# Patient Record
Sex: MALE | Race: WHITE | Employment: FULL TIME | URBAN - METROPOLITAN AREA
[De-identification: names, ages, dates, MRNs, and addresses within clinical notes are randomized per-mention and may not be internally consistent; named-entity substitution may affect disease eponyms.]

---

## 2017-10-12 ENCOUNTER — OFFICE VISIT (OUTPATIENT)
Dept: FAMILY MEDICINE CLINIC | Age: 42
End: 2017-10-12

## 2017-10-12 VITALS
SYSTOLIC BLOOD PRESSURE: 120 MMHG | HEART RATE: 66 BPM | TEMPERATURE: 98.2 F | BODY MASS INDEX: 31.04 KG/M2 | HEIGHT: 72 IN | DIASTOLIC BLOOD PRESSURE: 80 MMHG | WEIGHT: 229.2 LBS | OXYGEN SATURATION: 98 %

## 2017-10-12 DIAGNOSIS — L72.3 SEBACEOUS CYST: Primary | ICD-10-CM

## 2017-10-12 PROCEDURE — 99213 OFFICE O/P EST LOW 20 MIN: CPT | Performed by: FAMILY MEDICINE

## 2017-10-12 RX ORDER — DOXYCYCLINE 100 MG/1
100 CAPSULE ORAL 2 TIMES DAILY
Qty: 20 CAPSULE | Refills: 0 | Status: SHIPPED | OUTPATIENT
Start: 2017-10-12 | End: 2017-12-07 | Stop reason: SDUPTHER

## 2017-10-12 NOTE — PATIENT INSTRUCTIONS
Please read the healthy family handout that you were given and share it with your family. Please compare this printed medication list with your medications at home to be sure they are the same. If you have any medications that are different please contact us immediately at [phone]. Also review your allergies that we have listed, these may also include medications that you have not been able to tolerate, make sure everything listed is correct. If you have any allergies that are different please contact us immediately at [phone].

## 2017-10-18 ENCOUNTER — INITIAL CONSULT (OUTPATIENT)
Dept: SURGERY | Age: 42
End: 2017-10-18

## 2017-10-18 VITALS
BODY MASS INDEX: 31.15 KG/M2 | HEIGHT: 72 IN | SYSTOLIC BLOOD PRESSURE: 130 MMHG | DIASTOLIC BLOOD PRESSURE: 88 MMHG | WEIGHT: 230 LBS

## 2017-10-18 DIAGNOSIS — L72.3 SEBACEOUS CYST: Primary | ICD-10-CM

## 2017-10-18 PROCEDURE — G8484 FLU IMMUNIZE NO ADMIN: HCPCS | Performed by: SURGERY

## 2017-10-18 PROCEDURE — G8427 DOCREV CUR MEDS BY ELIG CLIN: HCPCS | Performed by: SURGERY

## 2017-10-18 PROCEDURE — 99241 PR OFFICE CONSULTATION NEW/ESTAB PATIENT 15 MIN: CPT | Performed by: SURGERY

## 2017-10-18 PROCEDURE — G8417 CALC BMI ABV UP PARAM F/U: HCPCS | Performed by: SURGERY

## 2017-10-23 RX ORDER — LIDOCAINE HYDROCHLORIDE 10 MG/ML
2 INJECTION, SOLUTION INFILTRATION; PERINEURAL
Status: CANCELLED | OUTPATIENT
Start: 2017-10-23 | End: 2017-10-23

## 2017-10-23 RX ORDER — SODIUM CHLORIDE, SODIUM LACTATE, POTASSIUM CHLORIDE, CALCIUM CHLORIDE 600; 310; 30; 20 MG/100ML; MG/100ML; MG/100ML; MG/100ML
INJECTION, SOLUTION INTRAVENOUS CONTINUOUS
Status: CANCELLED | OUTPATIENT
Start: 2017-10-23

## 2017-10-24 ENCOUNTER — HOSPITAL ENCOUNTER (OUTPATIENT)
Dept: SURGERY | Age: 42
Discharge: HOME | End: 2017-10-24
Attending: SURGERY | Admitting: SURGERY

## 2017-10-24 NOTE — PROGRESS NOTES
[facility]    HPI:  Patient is 35y.o. year old male seen at request of Ravi Raines MD.  He presents because of lump located on back. There has been pain at or near the lesion and a recent increase in size. There is not  previous history of similar. There has not been any biopsy. No past medical history on file. Past Surgical History:   Procedure Laterality Date    PILONIDAL CYST EXCISION  2004       Current Outpatient Prescriptions on File Prior to Visit   Medication Sig Dispense Refill    doxycycline monohydrate (MONODOX) 100 MG capsule Take 1 capsule by mouth 2 times daily 20 capsule 0     No current facility-administered medications on file prior to visit. No Known Allergies    Social History     Social History    Marital status:      Spouse name: N/A    Number of children: N/A    Years of education: N/A     Occupational History    Not on file. Social History Main Topics    Smoking status: Never Smoker    Smokeless tobacco: Current User     Types: Snuff    Alcohol use 18.0 oz/week     36 Standard drinks or equivalent per week    Drug use: No    Sexual activity: Not on file     Other Topics Concern    Not on file     Social History Narrative    No narrative on file       Family History   Problem Relation Age of Onset    Diabetes Father        ROS:  He reports no complaints related to the eyes, ears , nose throat or mouth. He denies weight loss. No chest pain. No SOB. No urinary complaints. No musculoskeletal complaints. Skin complaints include lump on back. No neurologic deficits. No bleeding tendencies. No GI complaints. Physical Exam:  Vitals:    10/18/17 1348   BP: 130/88   230#  6'0\"  General:  Comfortable. No distress. Eyes:  No scleral icterus  Ears:  Normal  Nose:  Normal  Mouth:  Mucous membranes moist  Respiratory: Lungs CTA. No accessory muscle use. Heart:  Regular rhythm  Abdomen:  Soft. Non tender.   Non

## 2017-12-07 ENCOUNTER — TELEPHONE (OUTPATIENT)
Dept: FAMILY MEDICINE CLINIC | Age: 42
End: 2017-12-07

## 2017-12-07 RX ORDER — DOXYCYCLINE 100 MG/1
100 CAPSULE ORAL 2 TIMES DAILY
Qty: 20 CAPSULE | Refills: 0 | Status: SHIPPED | OUTPATIENT
Start: 2017-12-07 | End: 2018-12-17 | Stop reason: ALTCHOICE

## 2017-12-07 NOTE — TELEPHONE ENCOUNTER
Patient was seen on 10/12 for sebaceous cyst was prescribed doxycycline, had the same thing come upon his shoulder last night wanting to know if antibiotic can be called in, please advise.

## 2018-12-17 ENCOUNTER — OFFICE VISIT (OUTPATIENT)
Dept: FAMILY MEDICINE CLINIC | Age: 43
End: 2018-12-17
Payer: COMMERCIAL

## 2018-12-17 VITALS
SYSTOLIC BLOOD PRESSURE: 124 MMHG | OXYGEN SATURATION: 98 % | HEART RATE: 61 BPM | WEIGHT: 229 LBS | DIASTOLIC BLOOD PRESSURE: 84 MMHG | TEMPERATURE: 98.7 F | BODY MASS INDEX: 31.05 KG/M2

## 2018-12-17 DIAGNOSIS — H61.22 IMPACTED CERUMEN OF LEFT EAR: Primary | ICD-10-CM

## 2018-12-17 PROCEDURE — 69210 REMOVE IMPACTED EAR WAX UNI: CPT | Performed by: NURSE PRACTITIONER

## 2018-12-17 PROCEDURE — 99214 OFFICE O/P EST MOD 30 MIN: CPT | Performed by: NURSE PRACTITIONER

## 2018-12-17 PROCEDURE — G8417 CALC BMI ABV UP PARAM F/U: HCPCS | Performed by: NURSE PRACTITIONER

## 2018-12-17 PROCEDURE — 4004F PT TOBACCO SCREEN RCVD TLK: CPT | Performed by: NURSE PRACTITIONER

## 2018-12-17 PROCEDURE — G8484 FLU IMMUNIZE NO ADMIN: HCPCS | Performed by: NURSE PRACTITIONER

## 2018-12-17 PROCEDURE — G8427 DOCREV CUR MEDS BY ELIG CLIN: HCPCS | Performed by: NURSE PRACTITIONER

## 2018-12-17 NOTE — PROGRESS NOTES
Assessment:  Encounter Diagnosis   Name Primary?  Impacted cerumen of left ear Yes       Plan:  1. Impacted cerumen of left ear  New problem  Stop using Q-tips and ear buds. May use earwax softener weekly. Ear was irrigated by Meghan Amaral MA. Complete cerumen removal.  TM intact. Canal intact and not irritated. No drops prescribed. Follow-up as needed  - [address], APRN-CNP    The note was completedusing Dragon voice recognition transcription. Every effort was made to ensure accuracy; however, inadvertent  transcription errors may be present despite my best efforts to edit errors.

## 2018-12-17 NOTE — PATIENT INSTRUCTIONS
thoroughly with a hair dryer set on low. Hold the dryer several inches from your ear. ? If the warm mineral oil and shower do not work, use an over-the-counter wax softener. Read and follow all instructions on the label. After using the wax softener, use an ear syringe to gently flush the ear. Make sure the flushing solution is body temperature. Cool or hot fluids in the ear can cause dizziness. When should you call for help? Call your doctor now or seek immediate medical care if:    · Pus or blood drains from your ear.     · Your ears are ringing or feel full.     · You have a loss of hearing.    Watch closely for changes in your health, and be sure to contact your doctor if:    · You have pain or reduced hearing after 1 week of home treatment.     · You have any new symptoms, such as nausea or balance problems. Where can you learn more? Go to [email or web address]://[email or web address]. org and sign in to your [employer] [address] account. Enter R955 in the [employer] box to learn more about \"Earwax Blockage: Care Instructions. \"     If you do not have an account, please click on the \"Sign Up Now\" link. Current as of: November 20, 2017  Content Version: 11.8  © 5318-6431 Healthwise, Incorporated. Care instructions adapted under license by [facility] ([facility]). If you have questions about a medical condition or this instruction, always ask your healthcare professional. [place] [address] any warranty or liability for your use of this information.

## 2020-01-31 ENCOUNTER — OFFICE VISIT (OUTPATIENT)
Dept: FAMILY MEDICINE CLINIC | Age: 45
End: 2020-01-31
Payer: COMMERCIAL

## 2020-01-31 VITALS
SYSTOLIC BLOOD PRESSURE: 124 MMHG | WEIGHT: 225 LBS | BODY MASS INDEX: 30.48 KG/M2 | HEIGHT: 72 IN | HEART RATE: 61 BPM | DIASTOLIC BLOOD PRESSURE: 75 MMHG | TEMPERATURE: 98.4 F | OXYGEN SATURATION: 96 %

## 2020-01-31 LAB
ALT SERPL-CCNC: 15 U/L (ref 10–40)
ANION GAP SERPL CALCULATED.3IONS-SCNC: 16 MMOL/L (ref 3–16)
BUN BLDV-MCNC: 21 MG/DL (ref 7–20)
CALCIUM SERPL-MCNC: 9.7 MG/DL (ref 8.3–10.6)
CHLORIDE BLD-SCNC: 104 MMOL/L (ref 99–110)
CHOLESTEROL, TOTAL: 182 MG/DL (ref 0–199)
CO2: 24 MMOL/L (ref 21–32)
CREAT SERPL-MCNC: 1.2 MG/DL (ref 0.9–1.3)
GFR AFRICAN AMERICAN: >60
GFR NON-AFRICAN AMERICAN: >60
GLUCOSE BLD-MCNC: 97 MG/DL (ref 70–99)
HDLC SERPL-MCNC: 37 MG/DL (ref 40–60)
LDL CHOLESTEROL CALCULATED: 106 MG/DL
POTASSIUM SERPL-SCNC: 4.6 MMOL/L (ref 3.5–5.1)
SODIUM BLD-SCNC: 144 MMOL/L (ref 136–145)
TRIGL SERPL-MCNC: 196 MG/DL (ref 0–150)
VLDLC SERPL CALC-MCNC: 39 MG/DL

## 2020-01-31 PROCEDURE — 99396 PREV VISIT EST AGE 40-64: CPT | Performed by: FAMILY MEDICINE

## 2020-01-31 PROCEDURE — 36415 COLL VENOUS BLD VENIPUNCTURE: CPT | Performed by: FAMILY MEDICINE

## 2020-01-31 PROCEDURE — G8484 FLU IMMUNIZE NO ADMIN: HCPCS | Performed by: FAMILY MEDICINE

## 2020-01-31 ASSESSMENT — PATIENT HEALTH QUESTIONNAIRE - PHQ9
SUM OF ALL RESPONSES TO PHQ QUESTIONS 1-9: 0
2. FEELING DOWN, DEPRESSED OR HOPELESS: 0
1. LITTLE INTEREST OR PLEASURE IN DOING THINGS: 0
SUM OF ALL RESPONSES TO PHQ QUESTIONS 1-9: 0
SUM OF ALL RESPONSES TO PHQ9 QUESTIONS 1 & 2: 0

## 2020-01-31 NOTE — PROGRESS NOTES
Subjective:  Martinez Lambert is here to discuss the following issues. This patient is here for well adult exam.  Overall he is doing well. He takes no prescription medicines. He has only some minor concerns to express. He has noted some mild difficulty in urination where his urine stream is not as strong as it was years ago. He gets up only once at night to urinate. He has noted some occasional mild constipation. This began after he quit using artificial sweeteners. Otherwise he states he is feeling well. He is fasting for blood work. No medication allergies and his problem list surgeries and hospitalizations are reviewed. Social and family history reviewed. Immunizations reviewed. He exercises every day. He eats a good variety of foods. He sleeps well at night. He works and in the [employer]. Patient reports no activity change, appetite change, neck pain, neck stiffness, eye discharge, eye pain, choking, stridor, chest pain, palpitations, diarrhea, vomiting, polydipsia, polyphagia, enuresis, hematuria, pallor, rash, seizure, syncope  Social History     Tobacco Use   Smoking Status Never Smoker   Smokeless Tobacco Current User    Types: Snuff   Allergies:     Patient has no known allergies. Objective:  /75   Pulse 61   Temp 98.4 °F (36.9 °C) (Oral)   Ht 5' 11.75\" (1.822 m)   Wt 225 lb (102.1 kg)   SpO2 96%   BMI 30.73 kg/m²    OBJECTIVE:  PHYSICAL EXAM:  Oriented to person place and time. Appears well-developed and well-nourished. No distress. Head normocephalic and atraumatic  Bilateral external ears normal  Nose normal  Oropharynx is clear and moist. No oral pharyngeal exudate  Conjunctivae and extraocular muscles are normal. Pupils are equal round and reactive. No ocular discharge. No scleral icterus  Neck normal range of motion. Neck supple. No tracheal deviation. No thyromegaly. Heart regular rate and rhythm with normal heart sounds. No gallop.  No murmur   Respiratory

## 2020-02-05 LAB
PROSTATE SPECIFIC ANTIGEN FREE: 0.1 UG/L
PROSTATE SPECIFIC ANTIGEN PERCENT FREE: 33.3 %
PROSTATE SPECIFIC ANTIGEN: 0.3 UG/L (ref 0–4)

## 2020-10-08 ENCOUNTER — TELEPHONE (OUTPATIENT)
Dept: FAMILY MEDICINE CLINIC | Age: 45
End: 2020-10-08

## 2020-10-08 NOTE — TELEPHONE ENCOUNTER
----- Message from Karyn Too sent at 10/8/2020  9:03 AM EDT -----  Subject: Message to Provider    QUESTIONS  Information for Provider? Patient scheduled annual physical for 12/4 at   3pm but he is needing blood work done for that. He is wondering if you   could put the orders in and that week he go in the morning to get his   blood work done so he doesn't have to go all day without eating.  ---------------------------------------------------------------------------  --------------  [address]  What is the best way for the office to contact you? OK to leave message on   voicemail  Preferred Call Back Phone Number? [phone]  ---------------------------------------------------------------------------  --------------  SCRIPT ANSWERS  Relationship to Patient?  Self

## 2020-12-01 ENCOUNTER — NURSE ONLY (OUTPATIENT)
Dept: FAMILY MEDICINE CLINIC | Age: 45
End: 2020-12-01
Payer: COMMERCIAL

## 2020-12-01 LAB
ALT SERPL-CCNC: 25 U/L (ref 10–40)
ANION GAP SERPL CALCULATED.3IONS-SCNC: 11 MMOL/L (ref 3–16)
BUN BLDV-MCNC: 18 MG/DL (ref 7–20)
CALCIUM SERPL-MCNC: 9.3 MG/DL (ref 8.3–10.6)
CHLORIDE BLD-SCNC: 105 MMOL/L (ref 99–110)
CHOLESTEROL, TOTAL: 192 MG/DL (ref 0–199)
CO2: 26 MMOL/L (ref 21–32)
CREAT SERPL-MCNC: 1.1 MG/DL (ref 0.9–1.3)
GFR AFRICAN AMERICAN: >60
GFR NON-AFRICAN AMERICAN: >60
GLUCOSE BLD-MCNC: 104 MG/DL (ref 70–99)
HDLC SERPL-MCNC: 35 MG/DL (ref 40–60)
LDL CHOLESTEROL CALCULATED: 114 MG/DL
POTASSIUM SERPL-SCNC: 4.6 MMOL/L (ref 3.5–5.1)
SODIUM BLD-SCNC: 142 MMOL/L (ref 136–145)
TRIGL SERPL-MCNC: 216 MG/DL (ref 0–150)
VLDLC SERPL CALC-MCNC: 43 MG/DL

## 2020-12-01 PROCEDURE — 36415 COLL VENOUS BLD VENIPUNCTURE: CPT | Performed by: FAMILY MEDICINE

## 2020-12-01 NOTE — PROGRESS NOTES
Mane Reyes blood out  rt ac space  with 23 gauge Butterfly needle, without incident, applied pressure to draw site and applied bandaid, marguerite 1 tube.

## 2020-12-04 ENCOUNTER — OFFICE VISIT (OUTPATIENT)
Dept: FAMILY MEDICINE CLINIC | Age: 45
End: 2020-12-04
Payer: COMMERCIAL

## 2020-12-04 VITALS
SYSTOLIC BLOOD PRESSURE: 126 MMHG | DIASTOLIC BLOOD PRESSURE: 78 MMHG | WEIGHT: 232 LBS | TEMPERATURE: 98.2 F | HEART RATE: 58 BPM | BODY MASS INDEX: 31.68 KG/M2 | OXYGEN SATURATION: 97 %

## 2020-12-04 PROBLEM — E78.00 HYPERCHOLESTEREMIA: Status: ACTIVE | Noted: 2020-12-04

## 2020-12-04 PROBLEM — E78.1 HYPERTRIGLYCERIDEMIA: Status: ACTIVE | Noted: 2020-12-04

## 2020-12-04 PROBLEM — R73.9 HYPERGLYCEMIA: Status: ACTIVE | Noted: 2020-12-04

## 2020-12-04 PROBLEM — H61.23 BILATERAL IMPACTED CERUMEN: Status: ACTIVE | Noted: 2020-12-04

## 2020-12-04 PROBLEM — Z82.49 FAMILY HISTORY OF EARLY CAD: Status: ACTIVE | Noted: 2020-12-04

## 2020-12-04 PROCEDURE — 36415 COLL VENOUS BLD VENIPUNCTURE: CPT | Performed by: FAMILY MEDICINE

## 2020-12-04 PROCEDURE — G8484 FLU IMMUNIZE NO ADMIN: HCPCS | Performed by: FAMILY MEDICINE

## 2020-12-04 PROCEDURE — 99396 PREV VISIT EST AGE 40-64: CPT | Performed by: FAMILY MEDICINE

## 2020-12-04 NOTE — PATIENT INSTRUCTIONS
Please read the healthy family handout that you were given and share it with your family. Please compare this printed medication list with your medications at home to be sure they are the same. If you have any medications that are different please contact us immediately at [phone]. Also review your allergies that we have listed, these may also include medications that you have not been able to tolerate, make sure everything listed is correct. If you have any allergies that are different please contact us immediately at [phone].

## 2020-12-04 NOTE — PROGRESS NOTES
with normal heart sounds. No gallop. No murmur   Respiratory effort normal and breath sounds normal. No stridor. No respiratory distress. No wheezing. No rales. Abdomen soft with normal bowel sounds. No distention and no mass. No tenderness rebound or guarding. Musculoskeletal shows normal range of motion. No edema. No cervical lymphadenopathy  No cranial nerve defects. Normal muscle tone. Coordination normal.  Skin is warm and dry. No diaphoresis. No erythema or pallor. Normal mood and affect. Behavior is normal. Judgment and thought content normal for age. Assessment:  1. Well adult exam    2. Hypercholesteremia    3. Hyperglycemia    4. Hypertriglyceridemia    5. Family history of early CAD    10. Bilateral impacted cerumen            Plan:  Recent labs reviewed  Added CRP and homocystine due to family history of coronary artery disease  Bilateral ear irrigation  Educated on proper ear hygiene  He will continue regular exercise and weight control  Follow-up in 1 year or as needed  \"Healthy Family Handout\" provided  Avoid exposure to all tobacco products. Read and consider all information provided by the pharmacy regarding prescribed medications before use  Careful medical compliance  Proper diet and weight management   Otherwise continue current treatment plan  Call or return if symptoms are not well controlled      All medical conditions for this patient are stable unless otherwise indicated    Froylan Ware MD    This note was transcribed using a voice recognition software system. Proper technique and careful oversight were used to increase transcription accuracy but inadvertent errors may be present.

## 2020-12-05 LAB
C-REACTIVE PROTEIN: 0.6 MG/L (ref 0–5.1)
HOMOCYSTEINE: 11 UMOL/L (ref 0–10)

## 2022-01-26 ENCOUNTER — TELEPHONE (OUTPATIENT)
Dept: FAMILY MEDICINE CLINIC | Age: 47
End: 2022-01-26

## 2022-01-26 DIAGNOSIS — Z12.5 SCREENING PSA (PROSTATE SPECIFIC ANTIGEN): ICD-10-CM

## 2022-01-26 DIAGNOSIS — R73.9 HYPERGLYCEMIA: ICD-10-CM

## 2022-01-26 DIAGNOSIS — E78.1 HYPERTRIGLYCERIDEMIA: ICD-10-CM

## 2022-01-26 DIAGNOSIS — E78.00 HYPERCHOLESTEREMIA: Primary | ICD-10-CM

## 2022-02-16 ENCOUNTER — NURSE ONLY (OUTPATIENT)
Dept: FAMILY MEDICINE CLINIC | Age: 47
End: 2022-02-16
Payer: COMMERCIAL

## 2022-02-16 DIAGNOSIS — E78.00 HYPERCHOLESTEREMIA: ICD-10-CM

## 2022-02-16 DIAGNOSIS — R73.9 HYPERGLYCEMIA: ICD-10-CM

## 2022-02-16 DIAGNOSIS — E78.1 HYPERTRIGLYCERIDEMIA: ICD-10-CM

## 2022-02-16 DIAGNOSIS — Z12.5 SCREENING PSA (PROSTATE SPECIFIC ANTIGEN): ICD-10-CM

## 2022-02-16 LAB
ANION GAP SERPL CALCULATED.3IONS-SCNC: 18 MMOL/L (ref 3–16)
BUN BLDV-MCNC: 14 MG/DL (ref 7–20)
CALCIUM SERPL-MCNC: 9.1 MG/DL (ref 8.3–10.6)
CHLORIDE BLD-SCNC: 103 MMOL/L (ref 99–110)
CHOLESTEROL, FASTING: 202 MG/DL (ref 0–199)
CO2: 22 MMOL/L (ref 21–32)
CREAT SERPL-MCNC: 1.3 MG/DL (ref 0.9–1.3)
GFR AFRICAN AMERICAN: >60
GFR NON-AFRICAN AMERICAN: 59
GLUCOSE BLD-MCNC: 96 MG/DL (ref 70–99)
HDLC SERPL-MCNC: 38 MG/DL (ref 40–60)
LDL CHOLESTEROL CALCULATED: 110 MG/DL
POTASSIUM SERPL-SCNC: 4.3 MMOL/L (ref 3.5–5.1)
PROSTATE SPECIFIC ANTIGEN: 0.45 NG/ML (ref 0–4)
SODIUM BLD-SCNC: 143 MMOL/L (ref 136–145)
TRIGLYCERIDE, FASTING: 272 MG/DL (ref 0–150)
VLDLC SERPL CALC-MCNC: 54 MG/DL

## 2022-02-16 PROCEDURE — 36415 COLL VENOUS BLD VENIPUNCTURE: CPT | Performed by: FAMILY MEDICINE

## 2022-02-17 ENCOUNTER — OFFICE VISIT (OUTPATIENT)
Dept: FAMILY MEDICINE CLINIC | Age: 47
End: 2022-02-17
Payer: COMMERCIAL

## 2022-02-17 VITALS
DIASTOLIC BLOOD PRESSURE: 77 MMHG | BODY MASS INDEX: 30.73 KG/M2 | HEART RATE: 62 BPM | TEMPERATURE: 98.7 F | SYSTOLIC BLOOD PRESSURE: 120 MMHG | WEIGHT: 225 LBS | OXYGEN SATURATION: 95 %

## 2022-02-17 DIAGNOSIS — E78.00 HYPERCHOLESTEREMIA: ICD-10-CM

## 2022-02-17 DIAGNOSIS — Z00.00 WELL ADULT EXAM: Primary | ICD-10-CM

## 2022-02-17 DIAGNOSIS — Z12.11 COLON CANCER SCREENING: ICD-10-CM

## 2022-02-17 LAB
ESTIMATED AVERAGE GLUCOSE: 102.5 MG/DL
HBA1C MFR BLD: 5.2 %

## 2022-02-17 PROCEDURE — G8484 FLU IMMUNIZE NO ADMIN: HCPCS | Performed by: FAMILY MEDICINE

## 2022-02-17 PROCEDURE — 99396 PREV VISIT EST AGE 40-64: CPT | Performed by: FAMILY MEDICINE

## 2022-02-17 ASSESSMENT — PATIENT HEALTH QUESTIONNAIRE - PHQ9
SUM OF ALL RESPONSES TO PHQ QUESTIONS 1-9: 0
SUM OF ALL RESPONSES TO PHQ9 QUESTIONS 1 & 2: 0
SUM OF ALL RESPONSES TO PHQ QUESTIONS 1-9: 0
1. LITTLE INTEREST OR PLEASURE IN DOING THINGS: 0
2. FEELING DOWN, DEPRESSED OR HOPELESS: 0

## 2022-02-17 NOTE — PATIENT INSTRUCTIONS
Please read the healthy family handout that you were given and share it with your family. Please compare this printed medication list with your medications at home to be sure they are the same. If you have any medications that are different please contact us immediately at [phone]. Also review your allergies that we have listed, these may also include medications that you have not been able to tolerate, make sure everything listed is correct. If you have any allergies that are different please contact us immediately at [phone].

## 2022-02-17 NOTE — PROGRESS NOTES
Subjective:  Daxa Baker is here for well adult exam.  He is 48years old. He states overall he feels well. He exercises every day. He had blood work performed in preparation for this visit. He has a history of mildly elevated cholesterol. He follows an appropriate diet and maintains an appropriate weight. He is due for colon cancer screening. He has no melena or hematochezia. He eats a good variety of foods. He sleeps well at night. No vision or hearing symptoms. He has regular dental exams. 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 He denies symptoms of depression. He feels well physically and emotionally. Patient reports no activity change, appetite change, neck pain, neck stiffness, eye discharge, eye pain, choking, stridor, chest pain, palpitations, diarrhea, vomiting, polydipsia, polyphagia, enuresis, hematuria, pallor, rash, seizure, syncope  Problem list medication list and allergy list reviewed. Surgeries and hospitalizations reviewed. Social and family history reviewed. Immunizations reviewed  Social History     Tobacco Use   Smoking Status Never Smoker   Smokeless Tobacco Current User    Types: Snuff   Allergies:     Patient has no known allergies. Objective:  /77   Pulse 62   Temp 98.7 °F (37.1 °C) (Oral)   Wt 225 lb (102.1 kg)   SpO2 95%   BMI 30.73 kg/m²    No acute distress, heart regular rate and rhythm without murmur, lungs clear to auscultation easy effort, abdomen soft nondistended, no clubbing or cyanosis    Assessment:  1. Well adult exam    2. Hypercholesteremia    3. Colon cancer screening            Plan:  Age-appropriate counseling  Referral for colon cancer screening  Reviewed labs performed in preparation for this visit. His cholesterol is mildly elevated but his cardiovascular risk is not sufficiently elevated to merit medication. Other mild changes are noted.   It must be remembered that he exercises regularly and this may affect his blood work results. Follow-up in 1 year or. The diagnoses listed in the assessment above are stable unless otherwise indicated. Age-specific preventative health recommendations were reviewed and the [facility]" was provided. Avoid exposure to tobacco products. Follow CDC guidelines for covid-19 prevention. Read and consider all information provided by the pharmacy regarding prescribed medications before use    Call or return for any problems that arise before the next scheduled appointment. Juancarlos Duggan MD    This note was transcribed using a voice recognition software system. Proper technique and careful oversight were used to increase transcription accuracy but inadvertent errors may be present.

## 2022-04-06 ENCOUNTER — OFFICE VISIT (OUTPATIENT)
Dept: FAMILY MEDICINE CLINIC | Age: 47
End: 2022-04-06
Payer: COMMERCIAL

## 2022-04-06 VITALS
TEMPERATURE: 97.6 F | HEART RATE: 60 BPM | DIASTOLIC BLOOD PRESSURE: 80 MMHG | SYSTOLIC BLOOD PRESSURE: 125 MMHG | OXYGEN SATURATION: 98 % | BODY MASS INDEX: 30.51 KG/M2 | WEIGHT: 223.4 LBS

## 2022-04-06 DIAGNOSIS — L02.91 ABSCESS: Primary | ICD-10-CM

## 2022-04-06 DIAGNOSIS — H61.23 BILATERAL IMPACTED CERUMEN: ICD-10-CM

## 2022-04-06 PROCEDURE — G8427 DOCREV CUR MEDS BY ELIG CLIN: HCPCS | Performed by: NURSE PRACTITIONER

## 2022-04-06 PROCEDURE — 69210 REMOVE IMPACTED EAR WAX UNI: CPT | Performed by: NURSE PRACTITIONER

## 2022-04-06 PROCEDURE — 4004F PT TOBACCO SCREEN RCVD TLK: CPT | Performed by: NURSE PRACTITIONER

## 2022-04-06 PROCEDURE — 99214 OFFICE O/P EST MOD 30 MIN: CPT | Performed by: NURSE PRACTITIONER

## 2022-04-06 PROCEDURE — G8417 CALC BMI ABV UP PARAM F/U: HCPCS | Performed by: NURSE PRACTITIONER

## 2022-04-06 ASSESSMENT — ENCOUNTER SYMPTOMS
GASTROINTESTINAL NEGATIVE: 1
RESPIRATORY NEGATIVE: 1
EYES NEGATIVE: 1
ALLERGIC/IMMUNOLOGIC NEGATIVE: 1

## 2022-04-06 NOTE — PROGRESS NOTES
Subjective:      Patient ID: Kostas Bruner is a 48 y.o. male. Chief Complaint   Patient presents with    Ear Fullness        HPI    Abscess  Patient presents for evaluation of a cutaneous abscess. Lesion is located in the left lower back. Onset was 2 days ago. Symptoms have redness, raised, tender however no known drainage. Abscess has associated symptoms of as previous noted. Patient does have previous history of cutaneous abscesses. Patient does not have diabetes. Cerumen Impaction  Marychuy Ruvalcaba presents for evaluation of a plugged ear. He noticed the symptoms in both ears, several weeks ago. He does have a prior history of cerumen impaction. Adismunt Pee denies ear pain. He was not using ear drops to loosen wax immediately prior to this visit. Review of Systems   Constitutional: Negative for appetite change, chills and fever. HENT:        Ears clogged bilat   Eyes: Negative. Respiratory: Negative. Cardiovascular: Negative. Gastrointestinal: Negative. Endocrine: Negative. Genitourinary: Negative. Musculoskeletal: Negative. Skin: Positive for wound (cyst on back). Allergic/Immunologic: Negative. Neurological: Negative. Psychiatric/Behavioral: Negative. Patient Active Problem List   Diagnosis    Rosacea    Allergic rhinitis    Hypercholesteremia    Hyperglycemia    Family history of early CAD    Hypertriglyceridemia    Bilateral impacted cerumen       No outpatient medications have been marked as taking for the 4/6/22 encounter (Office Visit) with MOISE Duque CNP. 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 Patient request incision and drainage. On exam noted 1.5 cm raised cystic-like area. Area is fluctuant on palpation with erythema, warmth and edema. Site cleansed with Betadine, 1 mL of 1% lidocaine with epinephrine injected to anesthetize area. #11 scalpel used to open area without difficulty. Moderate amount of purulent drainage expressed from site until completely cleared. Site covered with dressing. Advised to monitor and call with any questions or concerns. Patient prefers not to take oral antibiotics however will call with any problems or concerns. 2. Bilateral impacted cerumen  Cleansed with half-strength peroxide. Wax partially removed with lighted curette and flushing. Patient began to have some mild discomfort and and flushed was stopped at that time. Advised patient to use over-the-counter Debrox drops and follow-up in office in 2 weeks to have ears reevaluated. Patient to call with any problems or concerns. Patient agreeable.    - [id number] - MD REMOVE IMPACTED EAR WAX

## 2022-04-06 NOTE — PATIENT INSTRUCTIONS
Please read the healthy family handout that you were given and share it with your family. Please compare this printed medication list with your medications at home to be sure they are the same. If you have any medications that are different please contact us immediately at [phone]. Also review your allergies that we have listed, these may also include medications that you have not been able to tolerate, make sure everything listed is correct. If you have any allergies that are different please contact us immediately at [phone]. Patient Education        Skin Abscess: Care Instructions  Overview     A skin abscess is a bacterial infection that forms a pocket of pus. A boil is a kind of skin abscess. The doctor may have cut an opening in the abscess so that the pus can drain out. You may have gauze in the cut so that the abscess will stay open and keep draining. You may need antibiotics. You will need to followup with your doctor to make sure the infection has gone away. The doctor has checked you carefully, but problems can develop later. If you notice any problems or new symptoms, get medical treatment right away. Follow-up care is a key part of your treatment and safety. Be sure to make and go to all appointments, and call your doctor if you are having problems. It's also a good idea to know your test results and keep alist of the medicines you take. How can you care for yourself at home?  Apply warm and dry compresses, a heating pad set on low, or a hot water bottle 3 or 4 times a day for pain. Keep a cloth between the heat source and your skin.  If your doctor prescribed antibiotics, take them as directed. Do not stop taking them just because you feel better. You need to take the full course of antibiotics.  Take pain medicines exactly as directed. ? If the doctor gave you a prescription medicine for pain, take it as prescribed.   ? If you are not taking a prescription pain medicine, ask your doctor if you can take an over-the-counter medicine.  Keep your bandage clean and dry. Change the bandage whenever it gets wet or dirty, or at least one time a day.  If the abscess was packed with gauze:  ? Keep follow-up appointments to have the gauze changed or removed. If the doctor instructed you to remove the gauze, follow the instructions you were given for how to remove it. ? After the gauze is removed, soak the area in warm water for 15 to 20 minutes 2 times a day, until the wound closes. When should you call for help? Call your doctor now or seek immediate medical care if:     You have signs of worsening infection, such as:  ? Increased pain, swelling, warmth, or redness. ? Red streaks leading from the infected skin. ? Pus draining from the wound. ? A fever. Watch closely for changes in your health, and be sure to contact your doctor if:     You do not get better as expected. Where can you learn more? Go to https://[employer].Zinch. org and sign in to your [employer] account. Enter E082 in the [employer] box to learn more about \"Skin Abscess: Care Instructions. \"     If you do not have an account, please click on the \"Sign Up Now\" link. Current as of: November 15, 2021               Content Version: 13.2  © 5242-4900 Healthwise, Incorporated. Care instructions adapted under license by [facility] ([facility]). If you have questions about a medical condition or this instruction, always ask your healthcare professional. [place] [address] any warranty or liability for your use of this information. Patient Education        Earwax Blockage: Care Instructions  Your Care Instructions     Earwax is a natural substance that protects the ear canal. Normally, earwax drains from the ears and does not cause problems. Sometimes earwax builds up and hardens. Earwax blockage (also called cerumen impaction) can cause some loss of hearing and pain.  When wax is tightly packed, you will need to haveyour doctor remove it. Follow-up care is a key part of your treatment and safety. Be sure to make and go to all appointments, and call your doctor if you are having problems. It's also a good idea to know your test results and keep alist of the medicines you take. How can you care for yourself at home?  Do not try to remove earwax with cotton swabs, fingers, or other objects. This can make the blockage worse and damage the eardrum.  If your doctor recommends that you try to remove earwax at home:  ? Soften and loosen the earwax with warm mineral oil. You also can try hydrogen peroxide mixed with an equal amount of room temperature water. Place 2 drops of the fluid, warmed to body temperature, in the ear two times a day for up to 5 days. ? Once the wax is loose and soft, all that is usually needed to remove it from the ear canal is a gentle, warm shower. Direct the water into the ear, then tip your head to let the earwax drain out. Dry your ear thoroughly with a hair dryer set on low. Hold the dryer several inches from your ear. ? If the warm mineral oil and shower do not work, use an over-the-counter wax softener. Read and follow all instructions on the label. After using the wax softener, use an ear syringe to gently flush the ear. Make sure the flushing solution is body temperature. Cool or hot fluids in the ear can cause dizziness. When should you call for help? Call your doctor now or seek immediate medical care if:     Pus or blood drains from your ear.      Your ears are ringing or feel full.      You have a loss of hearing. Watch closely for changes in your health, and be sure to contact your doctor if:     You have pain or reduced hearing after 1 week of home treatment.      You have any new symptoms, such as nausea or balance problems. Where can you learn more? Go to https://chbree.[employer]. org and sign in to your [employer] account.  Enter X350 in the [facility] box to learn more about \"Earwax Blockage: Care Instructions. \"     If you do not have an account, please click on the \"Sign Up Now\" link. Current as of: July 1, 2021               Content Version: 13.2  © 7459-5391 [employer], Incorporated. Care instructions adapted under license by [facility] ([facility]). If you have questions about a medical condition or this instruction, always ask your healthcare professional. [address] any warranty or liability for your use of this information.

## 2022-04-09 LAB
GRAM STAIN RESULT: ABNORMAL
ORGANISM: ABNORMAL
WOUND/ABSCESS: ABNORMAL

## 2023-02-17 ENCOUNTER — TELEPHONE (OUTPATIENT)
Dept: FAMILY MEDICINE CLINIC | Age: 48
End: 2023-02-17

## 2023-02-17 DIAGNOSIS — E78.00 HYPERCHOLESTEREMIA: Primary | ICD-10-CM

## 2023-02-17 DIAGNOSIS — R73.9 HYPERGLYCEMIA: ICD-10-CM

## 2023-02-17 PROBLEM — H61.23 BILATERAL IMPACTED CERUMEN: Status: RESOLVED | Noted: 2020-12-04 | Resolved: 2023-02-17

## 2023-02-17 NOTE — TELEPHONE ENCOUNTER
----- Message from Raymundo Bolivar sent at 2/17/2023  9:02 AM EST -----  Subject: Referral Request    Reason for referral request? Patient would like for his routine blood work   orders to be placed for his annual physical scheduled for 03/06. Please   inform patient when orders are placed so he can have them done before the   appointment.   Provider patient wants to be referred to(if known):     Provider Phone Number(if known):    Additional Information for Provider?   ---------------------------------------------------------------------------  --------------  CALL BACK INFO    [phone]; OK to leave message on voicemail  ---------------------------------------------------------------------------  --------------

## 2023-02-21 ENCOUNTER — NURSE ONLY (OUTPATIENT)
Dept: FAMILY MEDICINE CLINIC | Age: 48
End: 2023-02-21
Payer: COMMERCIAL

## 2023-02-21 DIAGNOSIS — R73.9 HYPERGLYCEMIA: ICD-10-CM

## 2023-02-21 DIAGNOSIS — E78.00 HYPERCHOLESTEREMIA: ICD-10-CM

## 2023-02-21 LAB
ALT SERPL-CCNC: 17 U/L (ref 10–40)
ANION GAP SERPL CALCULATED.3IONS-SCNC: 11 MMOL/L (ref 3–16)
BUN BLDV-MCNC: 17 MG/DL (ref 7–20)
CALCIUM SERPL-MCNC: 9 MG/DL (ref 8.3–10.6)
CHLORIDE BLD-SCNC: 105 MMOL/L (ref 99–110)
CHOLESTEROL, TOTAL: 200 MG/DL (ref 0–199)
CO2: 25 MMOL/L (ref 21–32)
CREAT SERPL-MCNC: 1.3 MG/DL (ref 0.9–1.3)
GFR SERPL CREATININE-BSD FRML MDRD: >60 ML/MIN/{1.73_M2}
GLUCOSE BLD-MCNC: 99 MG/DL (ref 70–99)
HDLC SERPL-MCNC: 33 MG/DL (ref 40–60)
LDL CHOLESTEROL CALCULATED: 112 MG/DL
POTASSIUM SERPL-SCNC: 4.6 MMOL/L (ref 3.5–5.1)
SODIUM BLD-SCNC: 141 MMOL/L (ref 136–145)
TRIGL SERPL-MCNC: 277 MG/DL (ref 0–150)
VLDLC SERPL CALC-MCNC: 55 MG/DL

## 2023-02-21 PROCEDURE — 36415 COLL VENOUS BLD VENIPUNCTURE: CPT | Performed by: FAMILY MEDICINE

## 2023-02-22 LAB
ESTIMATED AVERAGE GLUCOSE: 96.8 MG/DL
HBA1C MFR BLD: 5 %

## 2023-03-06 ENCOUNTER — OFFICE VISIT (OUTPATIENT)
Dept: FAMILY MEDICINE CLINIC | Age: 48
End: 2023-03-06
Payer: COMMERCIAL

## 2023-03-06 VITALS
OXYGEN SATURATION: 96 % | WEIGHT: 230 LBS | SYSTOLIC BLOOD PRESSURE: 148 MMHG | HEART RATE: 62 BPM | DIASTOLIC BLOOD PRESSURE: 83 MMHG | BODY MASS INDEX: 31.41 KG/M2

## 2023-03-06 DIAGNOSIS — K63.5 POLYP OF COLON, UNSPECIFIED PART OF COLON, UNSPECIFIED TYPE: ICD-10-CM

## 2023-03-06 DIAGNOSIS — R03.0 ELEVATED BLOOD PRESSURE READING: ICD-10-CM

## 2023-03-06 DIAGNOSIS — E78.00 HYPERCHOLESTEREMIA: ICD-10-CM

## 2023-03-06 DIAGNOSIS — M25.511 CHRONIC RIGHT SHOULDER PAIN: Primary | ICD-10-CM

## 2023-03-06 DIAGNOSIS — G89.29 CHRONIC RIGHT SHOULDER PAIN: Primary | ICD-10-CM

## 2023-03-06 PROCEDURE — 4004F PT TOBACCO SCREEN RCVD TLK: CPT | Performed by: FAMILY MEDICINE

## 2023-03-06 PROCEDURE — G8417 CALC BMI ABV UP PARAM F/U: HCPCS | Performed by: FAMILY MEDICINE

## 2023-03-06 PROCEDURE — G8427 DOCREV CUR MEDS BY ELIG CLIN: HCPCS | Performed by: FAMILY MEDICINE

## 2023-03-06 PROCEDURE — 99214 OFFICE O/P EST MOD 30 MIN: CPT | Performed by: FAMILY MEDICINE

## 2023-03-06 PROCEDURE — G8484 FLU IMMUNIZE NO ADMIN: HCPCS | Performed by: FAMILY MEDICINE

## 2023-03-06 ASSESSMENT — PATIENT HEALTH QUESTIONNAIRE - PHQ9
1. LITTLE INTEREST OR PLEASURE IN DOING THINGS: 0
SUM OF ALL RESPONSES TO PHQ QUESTIONS 1-9: 0
2. FEELING DOWN, DEPRESSED OR HOPELESS: 0
SUM OF ALL RESPONSES TO PHQ9 QUESTIONS 1 & 2: 0
SUM OF ALL RESPONSES TO PHQ QUESTIONS 1-9: 0

## 2023-03-06 NOTE — PATIENT INSTRUCTIONS
Please read the healthy family handout that you were given and share it with your family. Please compare this printed medication list with your medications at home to be sure they are the same. If you have any medications that are different please contact us immediately at [phone]. Also review your allergies that we have listed, these may also include medications that you have not been able to tolerate, make sure everything listed is correct. If you have any allergies that are different please contact us immediately at [phone]. You may receive a survey in the mail or by email asking about your experience during your visit today. Please complete and return to us so we know how we are serving you.

## 2023-03-06 NOTE — PROGRESS NOTES
Subjective:  Suzan Garrettkalli is here to discuss the following issues. He has chronic right shoulder pain. No specific trauma. This began about 6 months ago. He has some difficulty raising his arm above his head and with liftoff reaching into his wallet for example. No redness warmth or swelling. No history of prior shoulder issues. He has elevated blood pressure without hypertension and typically blood pressures average below 140/90. He has elevated cholesterol and recent blood work showed essentially stable findings without indication for medication management. He had a recent colonoscopy revealing 2 benign polyps and was advised to follow-up in 5 years for retesting  Social History     Tobacco Use   Smoking Status Never   Smokeless Tobacco Current    Types: Snuff   Allergies:     Patient has no known allergies. Objective:  BP (!) 148/83   Pulse 62   Wt 230 lb (104.3 kg)   SpO2 96%   BMI 31.41 kg/m²    No acute distress, heart regular rate and rhythm without murmur, lungs clear to auscultation easy effort, abdomen nondistended, no clubbing or cyanosis the joint is stable no redness warmth or swelling there is slight weakness in supraspinatus function and some pain with external rotation    Assessment:  1. Chronic right shoulder pain    2. Elevated blood pressure reading    3. Hypercholesteremia    4. Polyp of colon, unspecified part of colon, unspecified type            Plan:  Right shoulder x-ray and MRI  Monitor blood pressures and advise me if elevated  Recent labs performed in preparation for this visit reviewed with patient today  Plan to repeat colonoscopy in 5 years  Follow-up in 1 year or as needed  The diagnoses listed in the assessment above are stable unless otherwise indicated. Age-specific preventative health recommendations were reviewed and a \"Healthy Family Handout\" has been provided. Avoid exposure to tobacco products. Follow COVID-19 CDC guidelines.   Read and consider all information provided by the pharmacy regarding prescribed medications before use. Call or return for any problems that arise before the next scheduled appointment. Eladio Ramey MD    This note was transcribed using a voice recognition software system. Proper technique and careful oversight were used to increase transcription accuracy but inadvertent errors may be present.

## 2023-03-16 ENCOUNTER — HOSPITAL ENCOUNTER (OUTPATIENT)
Dept: MRI IMAGING | Age: 48
Discharge: HOME | End: 2023-03-16
Payer: COMMERCIAL

## 2023-03-16 DIAGNOSIS — G89.29 CHRONIC RIGHT SHOULDER PAIN: ICD-10-CM

## 2023-03-16 DIAGNOSIS — M25.511 CHRONIC RIGHT SHOULDER PAIN: ICD-10-CM

## 2023-03-16 DIAGNOSIS — M25.511 ACUTE PAIN OF RIGHT SHOULDER: Primary | ICD-10-CM

## 2023-03-16 PROCEDURE — 73221 MRI JOINT UPR EXTREM W/O DYE: CPT

## 2023-10-18 ENCOUNTER — TELEPHONE (OUTPATIENT)
Dept: FAMILY MEDICINE CLINIC | Age: 48
End: 2023-10-18

## 2023-10-18 NOTE — TELEPHONE ENCOUNTER
----- Message from Sara Efrenanabella sent at 10/18/2023  3:06 PM EDT -----  Subject: Appointment Request    Reason for Call: Established Patient Appointment needed: Routine Pre-Op    QUESTIONS    Reason for appointment request? No appointments available during search     Additional Information for Provider? URGENT? Patient is having shoulder   surgery on 11/24 and needs preop clearance. Nothing was available upon   scheduling.  Please call to discuss ASAP.  ---------------------------------------------------------------------------  --------------  [address] [place]  [phone]; OK to leave message on voicemail  ---------------------------------------------------------------------------  --------------  SCRIPT ANSWERS

## 2023-11-06 SDOH — ECONOMIC STABILITY: HOUSING INSECURITY
IN THE LAST 12 MONTHS, WAS THERE A TIME WHEN YOU DID NOT HAVE A STEADY PLACE TO SLEEP OR SLEPT IN A SHELTER (INCLUDING NOW)?: NO

## 2023-11-06 SDOH — ECONOMIC STABILITY: FOOD INSECURITY: WITHIN THE PAST 12 MONTHS, THE FOOD YOU BOUGHT JUST DIDN'T LAST AND YOU DIDN'T HAVE MONEY TO GET MORE.: NEVER TRUE

## 2023-11-06 SDOH — ECONOMIC STABILITY: FOOD INSECURITY: WITHIN THE PAST 12 MONTHS, YOU WORRIED THAT YOUR FOOD WOULD RUN OUT BEFORE YOU GOT MONEY TO BUY MORE.: NEVER TRUE

## 2023-11-06 SDOH — ECONOMIC STABILITY: INCOME INSECURITY: HOW HARD IS IT FOR YOU TO PAY FOR THE VERY BASICS LIKE FOOD, HOUSING, MEDICAL CARE, AND HEATING?: NOT HARD AT ALL

## 2023-11-06 SDOH — ECONOMIC STABILITY: TRANSPORTATION INSECURITY
IN THE PAST 12 MONTHS, HAS LACK OF TRANSPORTATION KEPT YOU FROM MEETINGS, WORK, OR FROM GETTING THINGS NEEDED FOR DAILY LIVING?: NO

## 2023-11-09 ENCOUNTER — OFFICE VISIT (OUTPATIENT)
Dept: FAMILY MEDICINE CLINIC | Age: 48
End: 2023-11-09
Payer: COMMERCIAL

## 2023-11-09 VITALS
DIASTOLIC BLOOD PRESSURE: 88 MMHG | WEIGHT: 230.5 LBS | OXYGEN SATURATION: 95 % | BODY MASS INDEX: 31.22 KG/M2 | TEMPERATURE: 97.6 F | HEART RATE: 58 BPM | SYSTOLIC BLOOD PRESSURE: 127 MMHG | HEIGHT: 72 IN

## 2023-11-09 DIAGNOSIS — Z01.818 PRE-OP EXAM: Primary | ICD-10-CM

## 2023-11-09 LAB
ALBUMIN SERPL-MCNC: 4.7 G/DL (ref 3.4–5)
ALBUMIN/GLOB SERPL: 2 {RATIO} (ref 1.1–2.2)
ALP SERPL-CCNC: 76 U/L (ref 40–129)
ALT SERPL-CCNC: 19 U/L (ref 10–40)
ANION GAP SERPL CALCULATED.3IONS-SCNC: 14 MMOL/L (ref 3–16)
AST SERPL-CCNC: 24 U/L (ref 15–37)
BILIRUB SERPL-MCNC: 1 MG/DL (ref 0–1)
BUN SERPL-MCNC: 21 MG/DL (ref 7–20)
CALCIUM SERPL-MCNC: 9.3 MG/DL (ref 8.3–10.6)
CHLORIDE SERPL-SCNC: 103 MMOL/L (ref 99–110)
CO2 SERPL-SCNC: 23 MMOL/L (ref 21–32)
CREAT SERPL-MCNC: 1.3 MG/DL (ref 0.9–1.3)
GFR SERPLBLD CREATININE-BSD FMLA CKD-EPI: >60 ML/MIN/{1.73_M2}
GLUCOSE SERPL-MCNC: 106 MG/DL (ref 70–99)
INR PPP: 1 (ref 0.84–1.16)
POTASSIUM SERPL-SCNC: 4.9 MMOL/L (ref 3.5–5.1)
PROT SERPL-MCNC: 7.1 G/DL (ref 6.4–8.2)
PROTHROMBIN TIME: 13.2 SEC (ref 11.5–14.8)
SODIUM SERPL-SCNC: 140 MMOL/L (ref 136–145)

## 2023-11-09 PROCEDURE — 93000 ELECTROCARDIOGRAM COMPLETE: CPT | Performed by: NURSE PRACTITIONER

## 2023-11-09 PROCEDURE — 99213 OFFICE O/P EST LOW 20 MIN: CPT | Performed by: NURSE PRACTITIONER

## 2023-11-09 PROCEDURE — G8484 FLU IMMUNIZE NO ADMIN: HCPCS | Performed by: NURSE PRACTITIONER

## 2023-11-09 PROCEDURE — G8417 CALC BMI ABV UP PARAM F/U: HCPCS | Performed by: NURSE PRACTITIONER

## 2023-11-09 PROCEDURE — G8427 DOCREV CUR MEDS BY ELIG CLIN: HCPCS | Performed by: NURSE PRACTITIONER

## 2023-11-09 PROCEDURE — 3017F COLORECTAL CA SCREEN DOC REV: CPT | Performed by: NURSE PRACTITIONER

## 2023-11-09 PROCEDURE — 4004F PT TOBACCO SCREEN RCVD TLK: CPT | Performed by: NURSE PRACTITIONER

## 2023-11-09 PROCEDURE — 36415 COLL VENOUS BLD VENIPUNCTURE: CPT | Performed by: NURSE PRACTITIONER

## 2023-11-09 NOTE — PROGRESS NOTES
Preop history and physical    Patient:  Flavia Reynoso   YOB: 1975       Subjective:  Patient presents for evaluation of  his pre op. He is scheduled for right rotator cuff repair on 11/24/2023 with . He denies any recent illness or infections. No episodes of dizziness, lightheadedness, near syncope, chest pain or shortness of breath. Patient reports eating and drinking appropriately with no vomiting or dark tarry stools. Patient has known history of hypercholesteremia, allergic rhinitis, and family history of early CAD. Patient denies any current use of anticoagulation therapy, NSAIDs, vitamin E, fish oil and or aspirin. There is no significant history of abnormal bleeding or anesthesia complications. Patient Active Problem List    Diagnosis Date Noted    Polyp of colon 03/06/2023     Priority: Medium    Hypercholesteremia 12/04/2020 2/22/2023  The 10-year ASCVD risk score (Johnny TREVINO, et al., 2019) is: 4.7%    Values used to calculate the score:      Age: 50 years      Sex: Male      Is Non- : No      Diabetic: No      Tobacco smoker: No      Systolic Blood Pressure: 530 mmHg      Is BP treated: No      HDL Cholesterol: 33 mg/dL      Total Cholesterol: 200 mg/dL              Hyperglycemia 12/04/2020    Family history of early CAD 12/04/2020    Hypertriglyceridemia 12/04/2020    Allergic rhinitis 12/08/2015    Rosacea 01/27/2015       No past medical history on file. Past Surgical History:   Procedure Laterality Date    PILONIDAL CYST EXCISION  2004        No current outpatient medications on file prior to visit. No current facility-administered medications on file prior to visit. No Known Allergies     Family History   Problem Relation Age of Onset    Diabetes Father         Social History     Tobacco Use    Smoking status: Never    Smokeless tobacco: Current     Types: Snuff   Substance Use Topics    Alcohol use:  Yes

## 2023-11-10 LAB
BASOPHILS # BLD: 0 K/UL (ref 0–0.2)
BASOPHILS NFR BLD: 0.5 %
DEPRECATED RDW RBC AUTO: 13.7 % (ref 12.4–15.4)
EOSINOPHIL # BLD: 0.1 K/UL (ref 0–0.6)
EOSINOPHIL NFR BLD: 1.6 %
HCT VFR BLD AUTO: 43.7 % (ref 40.5–52.5)
HGB BLD-MCNC: 15.3 G/DL (ref 13.5–17.5)
LYMPHOCYTES # BLD: 1.6 K/UL (ref 1–5.1)
LYMPHOCYTES NFR BLD: 28.2 %
MCH RBC QN AUTO: 31.2 PG (ref 26–34)
MCHC RBC AUTO-ENTMCNC: 35 G/DL (ref 31–36)
MCV RBC AUTO: 89.2 FL (ref 80–100)
MONOCYTES # BLD: 0.4 K/UL (ref 0–1.3)
MONOCYTES NFR BLD: 7.2 %
NEUTROPHILS # BLD: 3.5 K/UL (ref 1.7–7.7)
NEUTROPHILS NFR BLD: 62.5 %
PLATELET # BLD AUTO: 229 K/UL (ref 135–450)
PMV BLD AUTO: 9.7 FL (ref 5–10.5)
RBC # BLD AUTO: 4.9 M/UL (ref 4.2–5.9)
WBC # BLD AUTO: 5.5 K/UL (ref 4–11)

## 2023-11-27 ENCOUNTER — HOSPITAL ENCOUNTER (OUTPATIENT)
Dept: PHYSICAL THERAPY | Age: 48
Setting detail: THERAPIES SERIES
Discharge: HOME | End: 2023-11-27
Payer: COMMERCIAL

## 2023-11-27 DIAGNOSIS — M62.81 MUSCLE WEAKNESS OF RIGHT UPPER EXTREMITY: ICD-10-CM

## 2023-11-27 DIAGNOSIS — M25.511 ACUTE PAIN OF RIGHT SHOULDER: Primary | ICD-10-CM

## 2023-11-27 PROCEDURE — 97112 NEUROMUSCULAR REEDUCATION: CPT

## 2023-11-27 PROCEDURE — 97140 MANUAL THERAPY 1/> REGIONS: CPT

## 2023-11-27 PROCEDURE — 97110 THERAPEUTIC EXERCISES: CPT

## 2023-11-27 PROCEDURE — 97161 PT EVAL LOW COMPLEX 20 MIN: CPT

## 2023-11-27 NOTE — FLOWSHEET NOTE
[address] and Therapy [address] [place], [place] [address]KEENAN, Lonnie Mclain  office: [phone] fax: [phone]      Physical Therapy: TREATMENT/PROGRESS NOTE   Patient: Baron Campa (23 y.o. male)   Treatment Date: 2023   :  1975 MRN: [id number]   Visit #: 1   Insurance Allowable Auth Needed   20 visits []Yes    []No    Insurance: Payor: Radha Rape / Plan: [address],[place] / Product Type: *No Product type* /   Insurance ID: [id number] - (Commercial)  Secondary Insurance (if applicable):    Treatment Diagnosis:     ICD-10-CM    1. Acute pain of right shoulder  M25.511       2.  Muscle weakness of right upper extremity  M62.81          Medical Diagnosis:    R RTC Repair (M75.121) with SAD 2023    Referring Physician: Natalio Vanessa DO  PCP: Angelito Akhtar MD                             Plan of care signed (Y/N):     Date of Patient follow up with Physician:      Progress Report/POC: EVAL today  POC update due: (10 visits [address], whichever is less)  2023     Precautions/ Contra-indications:                                                                                          Latex allergy:  NO  Pacemaker:    NO  Contraindications for Manipulation: recent surgical history (relative)  Date of Surgery: 2023  Other: ** Check Media File for Script ** Unsure of Extent of Size of Tear due to no script at time of evaluation    Preferred Language for Healthcare:   [x]English       []other:    SUBJECTIVE EXAMINATION     Patient Report/Comments: see eval     Test used Initial score  23   Pain Summary VAS 2-3/10    Functional questionnaire LEFS 42/55  70 %    Other:                OBJECTIVE EXAMINATION     Observation:     Test measurements: see eval    Exercises/Interventions:     Therapeutic Ex (76702)  resistance Sets/time Reps Notes/Cues/Progressions          Upper Trap Stretch  1  30\" 3 x

## 2023-11-30 ENCOUNTER — HOSPITAL ENCOUNTER (OUTPATIENT)
Dept: PHYSICAL THERAPY | Age: 48
Setting detail: THERAPIES SERIES
Discharge: HOME | End: 2023-11-30
Payer: COMMERCIAL

## 2023-11-30 PROCEDURE — 97112 NEUROMUSCULAR REEDUCATION: CPT

## 2023-11-30 PROCEDURE — 97140 MANUAL THERAPY 1/> REGIONS: CPT

## 2023-11-30 PROCEDURE — 97110 THERAPEUTIC EXERCISES: CPT

## 2023-11-30 NOTE — FLOWSHEET NOTE
increasing ROM. Patient will continue to benefit from ongoing evaluation and advanced clinical decision from a Physical Therapist to address and improve pain control, ROM, muscle strength, and neuromuscular control to safely return to PLOF without symptoms or restrictions. Medical Necessity Documentation:  I certify that this patient meets the below criteria necessary for medical necessity for care and/or justification of therapy services: The patient has functional impairments and/or activity limitations and would benefit from continued outpatient therapy services to address the deficits outlined in the patients goals    Treatment/Activity Tolerance:  [x] Patient tolerated treatment well [] Patient limited by fatique  [] Patient limited by pain  [] Patient limited by other medical complications  [] Other:     Return to Play: NA    Prognosis for POC: [x] Good [] Fair  [] Poor    Patient requires continued skilled intervention: [x] Yes  [] No      GOALS     Patient stated goal: To be able to return to full use of R UE to play Tennis and Run. Status: [] Progressing: [] Met: [] Not Met: [] Adjusted     Therapist goals for Patient:   Short Term Goals: To be achieved in: 2 weeks  Independent in HEP and progression per patient tolerance, in order to progress toward full function and prevent re-injury. Status: [] Progressing: [] Met: [] Not Met: [] Adjusted  Patient will have a decrease in pain to 0-1/10 to help  facilitate improvement in movement, function, and ADLs as indicated by functional deficits. Status: [] Progressing: [] Met: [] Not Met: [] Adjusted     Long Term Goals: To be achieved in: 12weeks  Disability index score of 15% or less for the Quick DASH to assist with return top prior level of function.                  Status: [] Progressing: [] Met: [] Not Met: [] Adjusted  Improve ROM to WNL to allow for proper joint functioning as indicated by patients functional

## 2023-12-05 ENCOUNTER — HOSPITAL ENCOUNTER (OUTPATIENT)
Dept: PHYSICAL THERAPY | Age: 48
Setting detail: THERAPIES SERIES
Discharge: HOME | End: 2023-12-05
Payer: COMMERCIAL

## 2023-12-05 PROCEDURE — 97112 NEUROMUSCULAR REEDUCATION: CPT

## 2023-12-05 PROCEDURE — 97110 THERAPEUTIC EXERCISES: CPT

## 2023-12-05 PROCEDURE — 97140 MANUAL THERAPY 1/> REGIONS: CPT

## 2023-12-05 NOTE — FLOWSHEET NOTE
[address] and Therapy [address] [place], [place] [address]KEENAN, Lonnie Mclain  office: [phone] fax: [phone]      Physical Therapy: TREATMENT/PROGRESS NOTE   Patient: Suki Richmond (25 y.o. male)   Treatment Date: 2023   :  1975 MRN: [id number]   Visit #: 3   Insurance Allowable Auth Needed   20 visits []Yes    []No    Insurance: Payor: Rob Azul / Plan: [address],[place] / Product Type: *No Product type* /   Insurance ID: [id number] - (Commercial)  Secondary Insurance (if applicable):    Treatment Diagnosis:     ICD-10-CM    1. Acute pain of right shoulder  M25.511       2. Muscle weakness of right upper extremity  M62.81          Medical Diagnosis:    R RTC Repair (M75.121) with SAD 2023    Referring Physician: Gita Palafox DO  PCP: Kerri Hanks MD                             Plan of care signed (Y/N):     Date of Patient follow up with Physician:      Progress Report/POC: NO  POC update due: (10 visits [address], whichever is less)  2023     Precautions/ Contra-indications:                                                                                          Latex allergy:  NO  Pacemaker:    NO  Contraindications for Manipulation: recent surgical history (relative)  Date of Surgery: 2023    Preferred Language for Healthcare:   [x]English       []other:    SUBJECTIVE EXAMINATION     Patient Report/Comments: (1 week Post-op 23) Pt states he is doing a little better, still not sleeping well. Pt states he is compliant with wearing sling and compliant with HEP.       Test used Initial score  23   Pain Summary VAS 2-3/10 3/10   Functional questionnaire LEFS 42/55  70 %    Other:                OBJECTIVE EXAMINATION     Observation:     Test measurements: see eval    Exercises/Interventions:     Therapeutic Ex (89510)  resistance Sets/time Reps Notes/Cues/Progressions          Upper Trap

## 2023-12-07 ENCOUNTER — HOSPITAL ENCOUNTER (OUTPATIENT)
Dept: PHYSICAL THERAPY | Age: 48
Setting detail: THERAPIES SERIES
Discharge: HOME | End: 2023-12-07
Payer: COMMERCIAL

## 2023-12-07 PROCEDURE — 97112 NEUROMUSCULAR REEDUCATION: CPT

## 2023-12-07 PROCEDURE — 97140 MANUAL THERAPY 1/> REGIONS: CPT

## 2023-12-07 PROCEDURE — 97110 THERAPEUTIC EXERCISES: CPT

## 2023-12-12 ENCOUNTER — HOSPITAL ENCOUNTER (OUTPATIENT)
Dept: PHYSICAL THERAPY | Age: 48
Setting detail: THERAPIES SERIES
Discharge: HOME | End: 2023-12-12
Payer: COMMERCIAL

## 2023-12-12 PROCEDURE — 97112 NEUROMUSCULAR REEDUCATION: CPT

## 2023-12-12 PROCEDURE — 97110 THERAPEUTIC EXERCISES: CPT

## 2023-12-12 PROCEDURE — 97140 MANUAL THERAPY 1/> REGIONS: CPT

## 2023-12-12 NOTE — FLOWSHEET NOTE
continue to benefit from ongoing evaluation and advanced clinical decision from a Physical Therapist to address and improve pain control, ROM, muscle strength, and neuromuscular control to safely return to PLOF without symptoms or restrictions. Medical Necessity Documentation:  I certify that this patient meets the below criteria necessary for medical necessity for care and/or justification of therapy services: The patient has functional impairments and/or activity limitations and would benefit from continued outpatient therapy services to address the deficits outlined in the patients goals    Treatment/Activity Tolerance:  [x] Patient tolerated treatment well [] Patient limited by fatique  [] Patient limited by pain  [] Patient limited by other medical complications  [] Other:     Return to Play: NA    Prognosis for POC: [x] Good [] Fair  [] Poor    Patient requires continued skilled intervention: [x] Yes  [] No      GOALS     Patient stated goal: To be able to return to full use of R UE to play Tennis and Run. Status: [x] Progressing: [] Met: [] Not Met: [] Adjusted     Therapist goals for Patient:   Short Term Goals: To be achieved in: 2 weeks  Independent in HEP and progression per patient tolerance, in order to progress toward full function and prevent re-injury. Status: [] Progressing: [x] Met: [] Not Met: [] Adjusted  Patient will have a decrease in pain to 0-1/10 to help  facilitate improvement in movement, function, and ADLs as indicated by functional deficits. Status: [x] Progressing: [] Met: [] Not Met: [] Adjusted     Long Term Goals: To be achieved in: 12weeks  Disability index score of 15% or less for the Quick DASH to assist with return top prior level of function. Status: [x] Progressing: [] Met: [] Not Met: [] Adjusted  Improve ROM to WNL to allow for proper joint functioning as indicated by patients functional deficits.   Status: [x] Progressing: []

## 2023-12-14 ENCOUNTER — APPOINTMENT (OUTPATIENT)
Dept: PHYSICAL THERAPY | Age: 48
End: 2023-12-14
Payer: COMMERCIAL

## 2023-12-21 ENCOUNTER — APPOINTMENT (OUTPATIENT)
Dept: PHYSICAL THERAPY | Age: 48
End: 2023-12-21
Payer: COMMERCIAL

## 2023-12-26 ENCOUNTER — HOSPITAL ENCOUNTER (OUTPATIENT)
Dept: PHYSICAL THERAPY | Age: 48
Setting detail: THERAPIES SERIES
Discharge: HOME | End: 2023-12-26
Payer: COMMERCIAL

## 2023-12-26 PROCEDURE — 97110 THERAPEUTIC EXERCISES: CPT

## 2023-12-26 PROCEDURE — 97140 MANUAL THERAPY 1/> REGIONS: CPT

## 2023-12-26 PROCEDURE — 97112 NEUROMUSCULAR REEDUCATION: CPT

## 2023-12-26 NOTE — FLOWSHEET NOTE
x daily - 7 x weekly - 1 sets - 5 reps - 30 hold      Access Code: [email or web address]  URL: [email or web address]  Update Date: 12/18/2023  Prepared by: Iglesia Dancer    Exercises  - Isometric Shoulder Abduction at Wall  - 1 x daily - 7 x weekly - 1 sets - 10 reps - 5\" hold  - Isometric Shoulder Flexion at Wall  - 1 x daily - 7 x weekly - 1 sets - 10 reps - 5\" hold  - Standing Isometric Shoulder External Rotation with Doorway  - 1 x daily - 7 x weekly - 1 sets - 10 reps - 5\" hold  - Standing Isometric Shoulder Internal Rotation at Doorway  - 1 x daily - 7 x weekly - 1 sets - 10 reps - 5\" hold  - Seated Shoulder Flexion Towel Slide at Table Top  - 1 x daily - 7 x weekly - 1 sets - 10 reps - 10\" hold  - Seated Shoulder Scaption Slide at Table Top with Forearm in Neutral  - 1 x daily - 7 x weekly - 1 sets - 10 reps - 10\" hold    ASSESSMENT     Today's Assessment: During therapy this date, patient required visual cueing for exercise progression, improving proper muscle recruitment and activation/motor control patterns, and increasing ROM. Patient will continue to benefit from ongoing evaluation and advanced clinical decision from a Physical Therapist to address and improve pain control, ROM, muscle strength, and neuromuscular control to safely return to PLOF without symptoms or restrictions. Medical Necessity Documentation:  I certify that this patient meets the below criteria necessary for medical necessity for care and/or justification of therapy services:   The patient has functional impairments and/or activity limitations and would benefit from continued outpatient therapy services to address the deficits outlined in the patients goals    Treatment/Activity Tolerance:  [x] Patient tolerated treatment well [] Patient limited by fatique  [] Patient limited by pain  [] Patient limited by other medical complications  [] Other:     Return to Play: NA    Prognosis for POC: [x] Good [] Fair  [] Poor    Patient requires

## 2023-12-28 ENCOUNTER — HOSPITAL ENCOUNTER (OUTPATIENT)
Dept: PHYSICAL THERAPY | Age: 48
Setting detail: THERAPIES SERIES
Discharge: HOME | End: 2023-12-28
Payer: COMMERCIAL

## 2023-12-28 PROCEDURE — 97110 THERAPEUTIC EXERCISES: CPT

## 2023-12-28 PROCEDURE — 97112 NEUROMUSCULAR REEDUCATION: CPT

## 2023-12-28 PROCEDURE — 97140 MANUAL THERAPY 1/> REGIONS: CPT

## 2023-12-28 NOTE — FLOWSHEET NOTE
[address] [place] [address] St and Therapy [address] [place], [place] [address]KEENAN [address]  office: [phone] fax: [phone]      Physical Therapy: TREATMENT/PROGRESS NOTE   Patient: Rehana Paredes (3 y.o. male)   Treatment Date: 2023   :  1975 MRN: [id number]   Visit #: 8   Insurance Allowable Auth Needed   20 visits []Yes    []No    Insurance: Payor: Alyson Screen / Plan: [address] [address] / Product Type: *No Product type* /   Insurance ID: [id number] - (Commercial)  Secondary Insurance (if applicable):    Treatment Diagnosis:     ICD-10-CM    1. Acute pain of right shoulder  M25.511       2. Muscle weakness of right upper extremity  M62.81          Medical Diagnosis:    R RTC Repair (M75.121) with SAD 2023    Referring Physician: Daniel Jose DO  PCP: Garret Monterroso MD                             Plan of care signed (Y/N):     Date of Patient follow up with Physician:      Progress Report/POC: NO  POC update due: (10 visits /OR [address], whichever is less)  2023     Precautions/ Contra-indications:                                                                                          Latex allergy:  NO  Pacemaker:    NO  Contraindications for Manipulation: recent surgical history (relative)  Date of Surgery: 2023    Preferred Language for Healthcare:   [x]English       []other:    SUBJECTIVE EXAMINATION     Patient Report/Comments: (5 week Post-op 23) Pt reports no changes since last visit. Pt states mornings tend to be stiff and sore but overall doing well. Pt has a follow up with MD today.      Test used Initial score  23   Pain Summary VAS 2-3/10 1-2/10   Functional questionnaire LEFS 42/55  70 %    Other:                OBJECTIVE EXAMINATION     Observation:     Test measurements: see eval    Exercises/Interventions:     Therapeutic Ex (92399)  resistance Sets/time Reps Notes/Cues/Progressions

## 2024-01-02 ENCOUNTER — HOSPITAL ENCOUNTER (OUTPATIENT)
Dept: PHYSICAL THERAPY | Age: 49
Setting detail: THERAPIES SERIES
Discharge: HOME | End: 2024-01-02
Payer: COMMERCIAL

## 2024-01-02 PROCEDURE — 97140 MANUAL THERAPY 1/> REGIONS: CPT

## 2024-01-02 PROCEDURE — 97110 THERAPEUTIC EXERCISES: CPT

## 2024-01-02 PROCEDURE — 97112 NEUROMUSCULAR REEDUCATION: CPT

## 2024-01-02 NOTE — PLAN OF CARE
eval    Exercises/Interventions:     Therapeutic Ex (07489)  resistance Sets/time Reps Notes/Cues/Progressions   Pulleys  5'     Upper Trap Stretch  30\" 3 x     Wall Slides  5\" 20 x  Flexion              Elbow Flex/Ext  1 20 x            Self PROM Shoulder Flexion  10\" 10 x     Self PROM Shoulder ER with Cane  10\" 10 x            PB Flexion stretch with wedge  10\" 10 x B Hands + Black PB          Table Slides Flex  10\" 10 x     Table Slides Scaption  10\" 10 x            Cane Flexion Stretch  10\" 10 x    Cane Press AAROM   20 x    Cane ER stretch  10\" 10 x     Supine Punches   NV           TB row/ext  5\" 20 x Green TB   Ball on trampoline    20 x B Hands + Black PB - Up/Dwn, L/R, CW/CCW          Manual Intervention (25091)  TIME     Grade 1 GH Jt mobs for relaxation  5'     PROM R Shoulder   10'                          NMR re-education (91979) resistance Sets/time Reps CUES NEEDED   Scap Squeeze  5\" 20 x     Shoulder Shrug with Scap Squeeze  5\" 20 x            Sub Max Pain Flex Iso's  10\" 10 x  Flex, IR, ER, Abd              Therapeutic Activity (68386)  Sets/time                                          Modalities:    No Modalities today    Education/Home Exercise Program: Patient HEP program created electronically.  Refer to [employer] access code:      Access Code: [email or web address]  URL: [email or web address]  Date: 01/02/2024  Prepared by: Panchito Wynne-Sloan    Exercises  - Isometric Shoulder Abduction at Wall  - 1 x daily - 7 x weekly - 1 sets - 10 reps - 5\" hold  - Standing Isometric Shoulder External Rotation with Doorway  - 1 x daily - 7 x weekly - 1 sets - 10 reps - 5\" hold  - Standing Isometric Shoulder Internal Rotation at Doorway  - 1 x daily - 7 x weekly - 1 sets - 10 reps - 5\" hold  - Seated Shoulder Flexion Towel Slide at Table Top  - 1 x daily - 7 x weekly - 1 sets - 10 reps - 10\" hold  - Seated Shoulder Scaption Slide at Table Top with Forearm in Neutral  - 1 x daily - 7 x weekly - 1 sets - 10

## 2024-01-05 ENCOUNTER — HOSPITAL ENCOUNTER (OUTPATIENT)
Dept: PHYSICAL THERAPY | Age: 49
Setting detail: THERAPIES SERIES
Discharge: HOME | End: 2024-01-05
Payer: COMMERCIAL

## 2024-01-05 PROCEDURE — 97140 MANUAL THERAPY 1/> REGIONS: CPT | Performed by: PHYSICAL THERAPY ASSISTANT

## 2024-01-05 PROCEDURE — 97112 NEUROMUSCULAR REEDUCATION: CPT | Performed by: PHYSICAL THERAPY ASSISTANT

## 2024-01-05 PROCEDURE — 97110 THERAPEUTIC EXERCISES: CPT | Performed by: PHYSICAL THERAPY ASSISTANT

## 2024-01-05 NOTE — FLOWSHEET NOTE
[facility]- Outpatient Rehabilitation and Therapy [address] [place] [address] office: [phone] fax: [phone]      Physical Therapy: TREATMENT/PROGRESS NOTE   Patient: Carlos Garcia (48 y.o. male)   Treatment Date: 2024   :  1975 MRN: [id number]   Visit #: 10 ( visit )  Insurance Allowable Auth Needed   20 visits []Yes    []No    Insurance: Payor: [employer] / Plan: [employer] CHOICE PLU / Product Type: *No Product type* /   Insurance ID: [id number] - (Commercial)  Secondary Insurance (if applicable):    Treatment Diagnosis:     ICD-10-CM    1. Acute pain of right shoulder  M25.511       2. Muscle weakness of right upper extremity  M62.81          Medical Diagnosis:    R RTC Repair (M75.121) with SAD 2023    Referring Physician: Swapnil Mcgrath DO  PCP: Bernabe Ulloa MD                             Plan of care signed (Y/N):     Date of Patient follow up with Physician:      Progress Report/POC: NO  POC update due: (10 visits /OR AUTH LIMITS, whichever is less)  2024     Precautions/ Contra-indications:                                                                                          Latex allergy:  NO  Pacemaker:    NO  Contraindications for Manipulation: recent surgical history (relative)  Date of Surgery: 2023        Preferred Language for Healthcare:   [x]English       []other:    SUBJECTIVE EXAMINATION     Patient Report/Comments: (6 week Post-op 2024) Shoulder is very stiff and sore in the mornings.  Takes until about midmorning for it to feel better.      Test used Initial score  23   Pain Summary VAS 2-3/10 1-2/10   Functional questionnaire Quick DASH 42/55  70 %   20%   Other (PROM)   Flex 165  IR 70  ER 65             OBJECTIVE EXAMINATION     Observation:     Test measurements: see eval    Exercises/Interventions:     Therapeutic Ex (89727)  resistance Sets/time Reps

## 2024-01-08 ENCOUNTER — HOSPITAL ENCOUNTER (OUTPATIENT)
Dept: PHYSICAL THERAPY | Age: 49
Setting detail: THERAPIES SERIES
Discharge: HOME | End: 2024-01-08
Payer: COMMERCIAL

## 2024-01-08 PROCEDURE — 97110 THERAPEUTIC EXERCISES: CPT

## 2024-01-08 PROCEDURE — 97112 NEUROMUSCULAR REEDUCATION: CPT

## 2024-01-08 PROCEDURE — 97140 MANUAL THERAPY 1/> REGIONS: CPT

## 2024-01-08 NOTE — FLOWSHEET NOTE
extensibility and allowing for proper ROM for normal function with self care, mobility, lifting and ambulation  (02999) VASOPNEUMATIC    TREATMENT PLAN   Plan: Cont POC- Continue emphasis/focus on exercise progression, improving proper muscle recruitment and activation/motor control patterns, increasing ROM, and improving soft tissue extensibility. Next visit plan to continue current phase     Electronically Signed by Panchito Ayala, PTA          Date: 01/08/2024     Note: If patient does not return for scheduled/recommended follow up visits, this note will serve as a discharge from care along with the most recent update on progress.

## 2024-01-11 ENCOUNTER — HOSPITAL ENCOUNTER (OUTPATIENT)
Dept: PHYSICAL THERAPY | Age: 49
Setting detail: THERAPIES SERIES
Discharge: HOME | End: 2024-01-11
Payer: COMMERCIAL

## 2024-01-11 PROCEDURE — 97110 THERAPEUTIC EXERCISES: CPT

## 2024-01-11 PROCEDURE — 97140 MANUAL THERAPY 1/> REGIONS: CPT

## 2024-01-11 PROCEDURE — 97112 NEUROMUSCULAR REEDUCATION: CPT

## 2024-01-11 NOTE — FLOWSHEET NOTE
[] Goals require adjustment due to lack of progress  [] Patient is not progressing as expected and requires additional follow up with physician  [] Other:     CHARGE CAPTURE     PT CHARGE GRID   CPT Code (TIMED) minutes # CPT Code (UNTIMED) #     Therex (54038)  15' 1  EVAL:LOW (54280 - Typically 20 minutes face-to-face)     Neuromusc. Re-ed (38464) 15' 1  Re-Eval (27950)     Manual (15732) 30' 2  Estim Unattended (55111)     Ther. Act (02444)    Mech. Traction (40935)     Gait (19556)    Dry Needle 1-2 muscle (25355)     Aquatic Therex (66880)    Dry Needle 3+ muscle (71497)     Iontophoresis (89035)    VASO (82808) X 0    Ultrasound (47202)    Group Therapy (10253)     Estim Attended (12509)    Canalith Repositioning (53040)     Other:    Other:    Total Timed Code Tx Minutes 60' 4       Total Treatment Minutes 70'        Charge Justification:  (80966) THERAPEUTIC EXERCISE - Provided verbal/tactile cueing for activities related to strengthening, flexibility, endurance, ROM performed to prevent loss of range of motion, maintain or improve muscular strength or increase flexibility, following either an injury or surgery.   (42224) NEUROMUSCULAR RE-EDUCATION - Therapeutic procedure, 1 or more areas, each 15 minutes; neuromuscular reeducation of movement, balance, coordination, kinesthetic sense, posture, and/or proprioception for sitting and/or standing activities  (14456) THERAPEUTIC ACTIVITY - use of dynamic activities to improve functional performance. (Ex include squatting, ascending/descending stairs, walking, bending, lifting, catching, throwing, pushing, pulling, jumping.)  Direct, one on one contact, billed in 15-minute increments.  (31200) MANUAL THERAPY -  Manual therapy techniques, 1 or more regions, each 15 minutes (Mobilization/manipulation, manual lymphatic drainage, manual traction) for the purpose of modulating pain, promoting relaxation,  increasing ROM, reducing/eliminating soft tissue

## 2024-01-15 ENCOUNTER — HOSPITAL ENCOUNTER (OUTPATIENT)
Dept: PHYSICAL THERAPY | Age: 49
Setting detail: THERAPIES SERIES
Discharge: HOME | End: 2024-01-15
Payer: COMMERCIAL

## 2024-01-15 PROCEDURE — 97110 THERAPEUTIC EXERCISES: CPT

## 2024-01-15 PROCEDURE — 97140 MANUAL THERAPY 1/> REGIONS: CPT

## 2024-01-15 PROCEDURE — 97112 NEUROMUSCULAR REEDUCATION: CPT

## 2024-01-15 NOTE — FLOWSHEET NOTE
increments.  (87634) MANUAL THERAPY -  Manual therapy techniques, 1 or more regions, each 15 minutes (Mobilization/manipulation, manual lymphatic drainage, manual traction) for the purpose of modulating pain, promoting relaxation,  increasing ROM, reducing/eliminating soft tissue swelling/inflammation/restriction, improving soft tissue extensibility and allowing for proper ROM for normal function with self care, mobility, lifting and ambulation  (30300) VASOPNEUMATIC    TREATMENT PLAN   Plan: Cont POC- Continue emphasis/focus on exercise progression, improving proper muscle recruitment and activation/motor control patterns, increasing ROM, and improving soft tissue extensibility. Next visit plan to continue current phase     Electronically Signed by Mitra Molina, PT , MPT,ATC, cert DN       Date: 01/15/2024     Note: If patient does not return for scheduled/recommended follow up visits, this note will serve as a discharge from care along with the most recent update on progress.

## 2024-01-17 ENCOUNTER — APPOINTMENT (OUTPATIENT)
Dept: PHYSICAL THERAPY | Age: 49
End: 2024-01-17
Payer: COMMERCIAL

## 2024-01-18 ENCOUNTER — HOSPITAL ENCOUNTER (OUTPATIENT)
Dept: PHYSICAL THERAPY | Age: 49
Setting detail: THERAPIES SERIES
Discharge: HOME | End: 2024-01-18
Payer: COMMERCIAL

## 2024-01-18 PROCEDURE — 97140 MANUAL THERAPY 1/> REGIONS: CPT

## 2024-01-18 PROCEDURE — 97112 NEUROMUSCULAR REEDUCATION: CPT

## 2024-01-18 PROCEDURE — 97110 THERAPEUTIC EXERCISES: CPT

## 2024-01-18 NOTE — FLOWSHEET NOTE
jumping.)  Direct, one on one contact, billed in 15-minute increments.  (72482) MANUAL THERAPY -  Manual therapy techniques, 1 or more regions, each 15 minutes (Mobilization/manipulation, manual lymphatic drainage, manual traction) for the purpose of modulating pain, promoting relaxation,  increasing ROM, reducing/eliminating soft tissue swelling/inflammation/restriction, improving soft tissue extensibility and allowing for proper ROM for normal function with self care, mobility, lifting and ambulation  (78142) VASOPNEUMATIC    TREATMENT PLAN   Plan: Cont POC- Continue emphasis/focus on exercise progression, improving proper muscle recruitment and activation/motor control patterns, increasing ROM, and improving soft tissue extensibility. Next visit plan to continue current phase     Electronically Signed by Panchito Ayala, PTA    Date: 01/18/2024     Note: If patient does not return for scheduled/recommended follow up visits, this note will serve as a discharge from care along with the most recent update on progress.

## 2024-01-19 ENCOUNTER — APPOINTMENT (OUTPATIENT)
Dept: PHYSICAL THERAPY | Age: 49
End: 2024-01-19
Payer: COMMERCIAL

## 2024-01-24 ENCOUNTER — HOSPITAL ENCOUNTER (OUTPATIENT)
Dept: PHYSICAL THERAPY | Age: 49
Setting detail: THERAPIES SERIES
Discharge: HOME | End: 2024-01-24
Payer: COMMERCIAL

## 2024-01-24 PROCEDURE — 97110 THERAPEUTIC EXERCISES: CPT | Performed by: PHYSICAL THERAPY ASSISTANT

## 2024-01-24 PROCEDURE — 97140 MANUAL THERAPY 1/> REGIONS: CPT | Performed by: PHYSICAL THERAPY ASSISTANT

## 2024-01-24 PROCEDURE — 97112 NEUROMUSCULAR REEDUCATION: CPT | Performed by: PHYSICAL THERAPY ASSISTANT

## 2024-01-24 NOTE — FLOWSHEET NOTE
[facility]- Outpatient Rehabilitation and Therapy [address] [address] office: [phone] fax: [phone]      Physical Therapy: TREATMENT/PROGRESS NOTE   Patient: Carlos Garcia (48 y.o. male)   Treatment Date: 2024   :  1975 MRN: [id number]   Visit #: 15 ( visit )  Insurance Allowable Auth Needed   20 visits []Yes    [x]No    Insurance: Payor: [employer] / Plan: [employer] - CHOICE PLU / Product Type: *No Product type* /   Insurance ID: [id number] - (Commercial)  Secondary Insurance (if applicable):    Treatment Diagnosis:     ICD-10-CM    1. Acute pain of right shoulder  M25.511       2. Muscle weakness of right upper extremity  M62.81          Medical Diagnosis:    R RTC Repair (M75.121) with SAD 2023    Referring Physician: Swapnil Mcgrath DO  PCP: Bernabe Ulloa MD                             Plan of care signed (Y/N): Y    Date of Patient follow up with Physician:      Progress Report/POC: NO  POC update due: (10 visits /OR AUTH LIMITS, whichever is less)  2024     Precautions/ Contra-indications:                                                                                          Latex allergy:  NO  Pacemaker:    NO  Contraindications for Manipulation: recent surgical history (relative)  Date of Surgery: 2023        Preferred Language for Healthcare:   [x]English       []other:    SUBJECTIVE EXAMINATION     Patient Report/Comments: (9 week Post-op 2024) : Was a little sore yesterday after doing a lot of writing the previous day and also sleeping in a hotel room bed.  Today he is feeling better.  Feels like all the soreness from the fall is abolished at this point.        During Supine shoulder Flexion patient reports having slight discomfort but not awful. Patient's standing AROM normalized to same as prior       Test used Initial score  23   Pain Summary VAS 2-3/10 0/10 @

## 2024-01-26 ENCOUNTER — HOSPITAL ENCOUNTER (OUTPATIENT)
Dept: PHYSICAL THERAPY | Age: 49
Setting detail: THERAPIES SERIES
Discharge: HOME | End: 2024-01-26
Payer: COMMERCIAL

## 2024-01-26 PROCEDURE — 97112 NEUROMUSCULAR REEDUCATION: CPT | Performed by: PHYSICAL THERAPY ASSISTANT

## 2024-01-26 PROCEDURE — 97110 THERAPEUTIC EXERCISES: CPT | Performed by: PHYSICAL THERAPY ASSISTANT

## 2024-01-26 PROCEDURE — 97140 MANUAL THERAPY 1/> REGIONS: CPT | Performed by: PHYSICAL THERAPY ASSISTANT

## 2024-01-26 NOTE — FLOWSHEET NOTE
walking, bending, lifting, catching, throwing, pushing, pulling, jumping.)  Direct, one on one contact, billed in 15-minute increments.  (54455) MANUAL THERAPY -  Manual therapy techniques, 1 or more regions, each 15 minutes (Mobilization/manipulation, manual lymphatic drainage, manual traction) for the purpose of modulating pain, promoting relaxation,  increasing ROM, reducing/eliminating soft tissue swelling/inflammation/restriction, improving soft tissue extensibility and allowing for proper ROM for normal function with self care, mobility, lifting and ambulation  (24726) VASOPNEUMATIC    TREATMENT PLAN   Plan: Cont POC- Continue emphasis/focus on exercise progression, improving proper muscle recruitment and activation/motor control patterns, increasing ROM, and improving soft tissue extensibility. Next visit plan to continue current phase     Electronically Signed by Charo Sinhg PTA    Date: 01/26/2024     Note: If patient does not return for scheduled/recommended follow up visits, this note will serve as a discharge from care along with the most recent update on progress.

## 2024-01-30 ENCOUNTER — HOSPITAL ENCOUNTER (OUTPATIENT)
Dept: PHYSICAL THERAPY | Age: 49
Setting detail: THERAPIES SERIES
Discharge: HOME | End: 2024-01-30
Payer: COMMERCIAL

## 2024-01-30 PROCEDURE — 97112 NEUROMUSCULAR REEDUCATION: CPT

## 2024-01-30 PROCEDURE — 97140 MANUAL THERAPY 1/> REGIONS: CPT

## 2024-01-30 PROCEDURE — 97110 THERAPEUTIC EXERCISES: CPT

## 2024-01-30 NOTE — PLAN OF CARE
[facility]- Outpatient Rehabilitation and Therapy [address], [place] [address] office: [phone] fax: [phone]    Physical Therapy Re-Certification Plan of Care    Dear Swapnil Mcgrath, DO  ,    We had the pleasure of treating the following patient for physical therapy services at [facility] Out[facility] Physical [facility]. A summary of our findings can be found in the updated assessment below.  This includes our plan of care.  If you have any questions or concerns regarding these findings, please do not hesitate to contact me at the office phone number checked above.  Thank you for the referral.     Physician Signature:________________________________Date:__________________  By signing above (or electronic signature), therapist's plan is approved by physician      Functional Outcome:   Carlos Garcia 1975 continues to present with functional deficits in ROM, joint mobility, flexibility, and endurance of strength  limiting ability with reaching overhead, carrying items, lifting items, pushing or pulling activity, and light home activity .  During therapy this date, patient required visual cueing for exercise progression, improving proper muscle recruitment and activation/motor control patterns, increasing ROM, allowing for proper ROM, and static and dynamic balance. Patient will continue to benefit from ongoing evaluation and advanced clinical decision from a Physical Therapist to improve pain control, ROM, muscle strength, neuromuscular control, normalization of gait, and ADL status to safely return to OF without symptoms or restrictions.    Overall Response to Treatment:   [x]Patient is responding well to treatment and improvement is noted with regards to goals   []Patient should continue to improve in reasonable time if they continue HEP   []Patient has plateaued and is no longer responding to skilled PT intervention    []Patient is getting worse and would benefit

## 2024-02-02 ENCOUNTER — HOSPITAL ENCOUNTER (OUTPATIENT)
Dept: PHYSICAL THERAPY | Age: 49
Setting detail: THERAPIES SERIES
Discharge: HOME | End: 2024-02-02
Payer: COMMERCIAL

## 2024-02-02 PROCEDURE — 97110 THERAPEUTIC EXERCISES: CPT | Performed by: PHYSICAL THERAPY ASSISTANT

## 2024-02-02 PROCEDURE — 97112 NEUROMUSCULAR REEDUCATION: CPT | Performed by: PHYSICAL THERAPY ASSISTANT

## 2024-02-02 PROCEDURE — 97140 MANUAL THERAPY 1/> REGIONS: CPT | Performed by: PHYSICAL THERAPY ASSISTANT

## 2024-02-02 NOTE — FLOWSHEET NOTE
[facility]- Outpatient Rehabilitation and Therapy [address] [place] [address] office: [phone] fax: [phone]      Physical Therapy: TREATMENT/PROGRESS NOTE   Patient: Carlos Garcia (48 y.o. male)   Treatment Date: 2024   :  1975 MRN: [id number]   Visit #: 18 ( visit )  Insurance Allowable Auth Needed   20 visits []Yes    [x]No    Insurance: Payor: [employer] HE[employer] / Plan: [employer] - CHOICE PLU / Product Type: *No Product type* /   Insurance ID: [id number] - (Commercial)  Secondary Insurance (if applicable):    Treatment Diagnosis:     ICD-10-CM    1. Acute pain of right shoulder  M25.511       2. Muscle weakness of right upper extremity  M62.81          Medical Diagnosis:    R RTC Repair (M75.121) with SAD 2023    Referring Physician: Swapnil Mcgrath DO  PCP: Bernabe Ulloa MD                             Plan of care signed (Y/N): Y    Date of Patient follow up with Physician:      Progress Report/POC: NO  POC update due: (10 visits /OR AUTH LIMITS, whichever is less)  2024     Precautions/ Contra-indications:                                                                                          Latex allergy:  NO  Pacemaker:    NO  Contraindications for Manipulation: recent surgical history (relative)  Date of Surgery: 2023        Preferred Language for Healthcare:   [x]English       []other:    SUBJECTIVE EXAMINATION     Patient Report/Comments: (10 week Post-op 2024) : Doing well - still has trouble with end range motion stiffness for functional activities of combing hair and reaching to put a coat on.       Test used Initial score  23   Pain Summary VAS 2-3/10 0/10 @ rest  3-4/10 with activity   Functional questionnaire Quick DASH   70 %  = 16%   Other (AROM)   Flex 168  Abd 165  ER 62             OBJECTIVE EXAMINATION     Observation:  slight substitution to obtain shoulder flexion at

## 2024-02-05 ENCOUNTER — HOSPITAL ENCOUNTER (OUTPATIENT)
Dept: PHYSICAL THERAPY | Age: 49
Setting detail: THERAPIES SERIES
Discharge: HOME | End: 2024-02-05
Payer: COMMERCIAL

## 2024-02-05 PROCEDURE — 97112 NEUROMUSCULAR REEDUCATION: CPT

## 2024-02-05 PROCEDURE — 97140 MANUAL THERAPY 1/> REGIONS: CPT

## 2024-02-05 PROCEDURE — 97110 THERAPEUTIC EXERCISES: CPT

## 2024-02-05 NOTE — FLOWSHEET NOTE
[facility]- Outpatient Rehabilitation and Therapy [address] [place] [address] office: [phone] fax: [phone]      Physical Therapy: TREATMENT/PROGRESS NOTE   Patient: Carlos Garcia (48 y.o. male)   Treatment Date: 2024   :  1975 MRN: [id number]   Visit #: 19 ( visit )  Insurance Allowable Auth Needed   20 visits []Yes    [x]No    Insurance: Payor: [employer] HE[employer]CARE / Plan: "[employer] - CHOICE PLU / Product Type: *No Product type* /   Insurance ID: [id number] - (Commercial)  Secondary Insurance (if applicable):    Treatment Diagnosis:     ICD-10-CM    1. Acute pain of right shoulder  M25.511       2. Muscle weakness of right upper extremity  M62.81          Medical Diagnosis:    R RTC Repair (M75.121) with SAD 2023    Referring Physician: Swapnil Mcgrath DO  PCP: Bernabe Ulloa MD                             Plan of care signed (Y/N): Y    Date of Patient follow up with Physician:      Progress Report/POC: NO  POC update due: (10 visits /OR AUTH LIMITS, whichever is less)  2024     Precautions/ Contra-indications:                                                                                          Latex allergy:  NO  Pacemaker:    NO  Contraindications for Manipulation: recent surgical history (relative)  Date of Surgery: 2023        Preferred Language for Healthcare:   [x]English       []other:    SUBJECTIVE EXAMINATION     Patient Report/Comments: (10 week Post-op 2024) : Patient reports everything continues to feel better, states that the biggest challenge for him still is combing his hair.     Functional questionnaire Quick DASH 42/55  70 % 18/55 = 16%   Other (AROM)   Flex 168  Abd 165  ER 62   Pain   0/10 normally  5/10 with combing hair/ER movement       OBJECTIVE EXAMINATION     Observation:  slight substitution to obtain shoulder flexion at past measurements    Test measurements: Minimal guarding

## 2024-02-09 ENCOUNTER — HOSPITAL ENCOUNTER (OUTPATIENT)
Dept: PHYSICAL THERAPY | Age: 49
Setting detail: THERAPIES SERIES
Discharge: HOME | End: 2024-02-09
Payer: COMMERCIAL

## 2024-02-09 PROCEDURE — 97140 MANUAL THERAPY 1/> REGIONS: CPT

## 2024-02-09 PROCEDURE — 97112 NEUROMUSCULAR REEDUCATION: CPT

## 2024-02-09 PROCEDURE — 97110 THERAPEUTIC EXERCISES: CPT

## 2024-02-13 ENCOUNTER — HOSPITAL ENCOUNTER (OUTPATIENT)
Dept: PHYSICAL THERAPY | Age: 49
Setting detail: THERAPIES SERIES
Discharge: HOME | End: 2024-02-13
Payer: COMMERCIAL

## 2024-02-13 PROCEDURE — 97140 MANUAL THERAPY 1/> REGIONS: CPT

## 2024-02-13 PROCEDURE — 97112 NEUROMUSCULAR REEDUCATION: CPT

## 2024-02-13 PROCEDURE — 97110 THERAPEUTIC EXERCISES: CPT

## 2024-02-13 NOTE — PLAN OF CARE
[facility]- Outpatient Rehabilitation and Therapy [address], [place] [address] office: [phone] fax: [phone]    Physical Therapy Re-Certification Plan of Care    Dear Swapnil Mcgrath, DO  ,    We had the pleasure of treating the following patient for physical therapy services at [facility] Outpatient Physical Therapy. A summary of our findings can be found in the updated assessment below.  This includes our plan of care.  If you have any questions or concerns regarding these findings, please do not hesitate to contact me at the office phone number checked above.  Thank you for the referral.     Physician Signature:________________________________Date:__________________  By signing above (or electronic signature), therapist's plan is approved by physician      Functional Outcome: Shae Garcia 1975 continues to present with functional deficits in ROM, joint mobility, strength symmetry, flexibility, and endurance of strength  limiting ability with carrying items, lifting items, pushing or pulling activity, ADLs, light home activity, and heavy home activity .  During therapy this date, patient required visual cueing and verbal cueing for exercise progression, improving proper muscle recruitment and activation/motor control patterns, increasing ROM, improving soft tissue extensibility, and allowing for proper ROM. Patient will continue to benefit from ongoing evaluation and advanced clinical decision from a Physical Therapist to improve pain control, ROM, muscle strength, and proper body mechanics to safely return to OF without symptoms or restrictions.    Overall Response to Treatment:   [x]Patient is responding well to treatment and improvement is noted with regards to goals   []Patient should continue to improve in reasonable time if they continue HEP   []Patient has plateaued and is no longer responding to skilled PT intervention    []Patient is

## 2024-07-18 ENCOUNTER — TELEPHONE (OUTPATIENT)
Dept: FAMILY MEDICINE CLINIC | Age: 49
End: 2024-07-18

## 2024-07-18 DIAGNOSIS — R73.9 HYPERGLYCEMIA: ICD-10-CM

## 2024-07-18 DIAGNOSIS — Z12.5 SCREENING PSA (PROSTATE SPECIFIC ANTIGEN): ICD-10-CM

## 2024-07-18 DIAGNOSIS — E78.00 HYPERCHOLESTEREMIA: Primary | ICD-10-CM

## 2024-07-18 DIAGNOSIS — Z82.49 FAMILY HISTORY OF EARLY CAD: ICD-10-CM

## 2024-07-18 DIAGNOSIS — R53.83 FATIGUE, UNSPECIFIED TYPE: ICD-10-CM

## 2024-07-18 NOTE — TELEPHONE ENCOUNTER
Patient has an appointment scheduled for a physical on 7/31/24. Patient says he wants to have the blood work done before he comes in for the appointment. Asking if Dr Ulloa would place the orders so he can come in one day to have done

## 2024-07-26 ENCOUNTER — NURSE ONLY (OUTPATIENT)
Dept: FAMILY MEDICINE CLINIC | Age: 49
End: 2024-07-26
Payer: COMMERCIAL

## 2024-07-26 DIAGNOSIS — E78.00 HYPERCHOLESTEREMIA: ICD-10-CM

## 2024-07-26 DIAGNOSIS — R73.9 HYPERGLYCEMIA: ICD-10-CM

## 2024-07-26 DIAGNOSIS — Z12.5 SCREENING PSA (PROSTATE SPECIFIC ANTIGEN): ICD-10-CM

## 2024-07-26 DIAGNOSIS — Z82.49 FAMILY HISTORY OF EARLY CAD: ICD-10-CM

## 2024-07-26 DIAGNOSIS — R53.83 FATIGUE, UNSPECIFIED TYPE: ICD-10-CM

## 2024-07-26 LAB
ALT SERPL-CCNC: 15 U/L (ref 10–40)
ANION GAP SERPL CALCULATED.3IONS-SCNC: 9 MMOL/L (ref 3–16)
AST SERPL-CCNC: 17 U/L (ref 15–37)
BUN SERPL-MCNC: 23 MG/DL (ref 7–20)
CALCIUM SERPL-MCNC: 9.1 MG/DL (ref 8.3–10.6)
CHLORIDE SERPL-SCNC: 105 MMOL/L (ref 99–110)
CHOLEST SERPL-MCNC: 204 MG/DL (ref 0–199)
CO2 SERPL-SCNC: 27 MMOL/L (ref 21–32)
CREAT SERPL-MCNC: 1.3 MG/DL (ref 0.9–1.3)
GFR SERPLBLD CREATININE-BSD FMLA CKD-EPI: 67 ML/MIN/{1.73_M2}
GLUCOSE SERPL-MCNC: 98 MG/DL (ref 70–99)
HDLC SERPL-MCNC: 36 MG/DL (ref 40–60)
LDLC SERPL CALC-MCNC: 114 MG/DL
POTASSIUM SERPL-SCNC: 4.9 MMOL/L (ref 3.5–5.1)
PSA SERPL DL<=0.01 NG/ML-MCNC: 0.52 NG/ML (ref 0–4)
SODIUM SERPL-SCNC: 141 MMOL/L (ref 136–145)
TRIGL SERPL-MCNC: 272 MG/DL (ref 0–150)
VLDLC SERPL CALC-MCNC: 54 MG/DL

## 2024-07-26 PROCEDURE — 36415 COLL VENOUS BLD VENIPUNCTURE: CPT | Performed by: FAMILY MEDICINE

## 2024-07-27 LAB
EST. AVERAGE GLUCOSE BLD GHB EST-MCNC: 102.5 MG/DL
HBA1C MFR BLD: 5.2 %

## 2024-07-29 ASSESSMENT — PATIENT HEALTH QUESTIONNAIRE - PHQ9
1. LITTLE INTEREST OR PLEASURE IN DOING THINGS: NOT AT ALL
1. LITTLE INTEREST OR PLEASURE IN DOING THINGS: NOT AT ALL
2. FEELING DOWN, DEPRESSED OR HOPELESS: NOT AT ALL
SUM OF ALL RESPONSES TO PHQ QUESTIONS 1-9: 0
SUM OF ALL RESPONSES TO PHQ QUESTIONS 1-9: 0
SUM OF ALL RESPONSES TO PHQ9 QUESTIONS 1 & 2: 0
2. FEELING DOWN, DEPRESSED OR HOPELESS: NOT AT ALL
SUM OF ALL RESPONSES TO PHQ QUESTIONS 1-9: 0
SUM OF ALL RESPONSES TO PHQ QUESTIONS 1-9: 0
SUM OF ALL RESPONSES TO PHQ9 QUESTIONS 1 & 2: 0

## 2024-07-31 ENCOUNTER — OFFICE VISIT (OUTPATIENT)
Dept: FAMILY MEDICINE CLINIC | Age: 49
End: 2024-07-31
Payer: COMMERCIAL

## 2024-07-31 VITALS
OXYGEN SATURATION: 96 % | WEIGHT: 233 LBS | HEIGHT: 72 IN | HEART RATE: 67 BPM | SYSTOLIC BLOOD PRESSURE: 131 MMHG | DIASTOLIC BLOOD PRESSURE: 88 MMHG | BODY MASS INDEX: 31.56 KG/M2 | TEMPERATURE: 98.8 F

## 2024-07-31 DIAGNOSIS — L72.3 SEBACEOUS CYST: ICD-10-CM

## 2024-07-31 DIAGNOSIS — R73.9 HYPERGLYCEMIA: ICD-10-CM

## 2024-07-31 DIAGNOSIS — K63.5 POLYP OF COLON, UNSPECIFIED PART OF COLON, UNSPECIFIED TYPE: ICD-10-CM

## 2024-07-31 DIAGNOSIS — E78.1 HYPERTRIGLYCERIDEMIA: ICD-10-CM

## 2024-07-31 DIAGNOSIS — E78.00 HYPERCHOLESTEREMIA: Primary | ICD-10-CM

## 2024-07-31 PROCEDURE — 99214 OFFICE O/P EST MOD 30 MIN: CPT | Performed by: FAMILY MEDICINE

## 2024-07-31 PROCEDURE — G8427 DOCREV CUR MEDS BY ELIG CLIN: HCPCS | Performed by: FAMILY MEDICINE

## 2024-07-31 PROCEDURE — G8417 CALC BMI ABV UP PARAM F/U: HCPCS | Performed by: FAMILY MEDICINE

## 2024-07-31 PROCEDURE — 4004F PT TOBACCO SCREEN RCVD TLK: CPT | Performed by: FAMILY MEDICINE

## 2024-07-31 PROCEDURE — 3017F COLORECTAL CA SCREEN DOC REV: CPT | Performed by: FAMILY MEDICINE

## 2024-07-31 NOTE — PROGRESS NOTES
Subjective:  Carlos Garcia is here to discuss the following issues.  He has elevated cholesterol and triglycerides.  He had recent fasting blood work.  He continues to exercise very regularly and watch his diet.  His weight is stable.  He has history of hyperglycemia with no polydipsia or polyuria.  Recent blood work was favorable for this.  He has a growth on the posterior lateral right side of his neck with some tenderness.  This has arisen over the last month and is consistent with other lesions diagnosed as sebaceous cyst across his back.  No red streaking fever sweats or chills.  He has history of colon polyps will be due for repeat testing in 4 years.  No related symptoms  Social History     Tobacco Use   Smoking Status Never   Smokeless Tobacco Current    Types: Snuff   Allergies:     Patient has no known allergies.    Objective:  /88 (Site: Right Upper Arm, Position: Sitting, Cuff Size: Large Adult)   Pulse 67   Temp 98.8 °F (37.1 °C)   Ht 1.829 m (6')   Wt 105.7 kg (233 lb)   SpO2 96%   BMI 31.60 kg/m²    No acute distress, heart regular rate and rhythm without murmur, lungs clear to auscultation easy effort, abdomen nondistended, no clubbing or cyanosis flocculent slightly reddened cyst noted in the involved area    Assessment:  1. Hypercholesteremia    2. Hypertriglyceridemia    3. Hyperglycemia    4. Sebaceous cyst    5. Polyp of colon, unspecified part of colon, unspecified type            Plan:  Reviewed labs performed in preparation for this visit.  Results were excellent.  General surgery referral  Continue excellent exercise regiment proper diet and weight control  Follow-up in 1 year or as needed  The diagnoses listed in the assessment above are stable unless otherwise indicated.   Age-specific preventative health recommendations were reviewed and a \"Healthy Family Handout\" has been provided.  Avoid exposure to tobacco products.  Follow COVID-19 CDC guidelines.  Read and consider all

## 2024-08-12 ENCOUNTER — PREP FOR PROCEDURE (OUTPATIENT)
Dept: SURGERY | Age: 49
End: 2024-08-12

## 2024-08-12 ENCOUNTER — INITIAL CONSULT (OUTPATIENT)
Dept: SURGERY | Age: 49
End: 2024-08-12
Payer: COMMERCIAL

## 2024-08-12 VITALS
WEIGHT: 233.4 LBS | BODY MASS INDEX: 31.61 KG/M2 | SYSTOLIC BLOOD PRESSURE: 128 MMHG | DIASTOLIC BLOOD PRESSURE: 80 MMHG | HEIGHT: 72 IN

## 2024-08-12 DIAGNOSIS — L72.3 SEBACEOUS CYST: Primary | ICD-10-CM

## 2024-08-12 DIAGNOSIS — L72.3 SEBACEOUS CYST: ICD-10-CM

## 2024-08-12 PROCEDURE — 99202 OFFICE O/P NEW SF 15 MIN: CPT | Performed by: SURGERY

## 2024-08-12 PROCEDURE — G8427 DOCREV CUR MEDS BY ELIG CLIN: HCPCS | Performed by: SURGERY

## 2024-08-12 PROCEDURE — 3017F COLORECTAL CA SCREEN DOC REV: CPT | Performed by: SURGERY

## 2024-08-12 PROCEDURE — G8417 CALC BMI ABV UP PARAM F/U: HCPCS | Performed by: SURGERY

## 2024-08-12 PROCEDURE — 4004F PT TOBACCO SCREEN RCVD TLK: CPT | Performed by: SURGERY

## 2024-08-12 RX ORDER — CEPHALEXIN 500 MG/1
500 CAPSULE ORAL 3 TIMES DAILY
Qty: 21 CAPSULE | Refills: 0 | Status: SHIPPED | OUTPATIENT
Start: 2024-08-12 | End: 2024-08-19

## 2024-08-14 ENCOUNTER — HOSPITAL ENCOUNTER (OUTPATIENT)
Age: 49
Setting detail: OUTPATIENT SURGERY
Discharge: HOME | End: 2024-08-14
Attending: SURGERY | Admitting: SURGERY
Payer: COMMERCIAL

## 2024-08-14 VITALS
BODY MASS INDEX: 31.15 KG/M2 | OXYGEN SATURATION: 97 % | SYSTOLIC BLOOD PRESSURE: 137 MMHG | DIASTOLIC BLOOD PRESSURE: 88 MMHG | WEIGHT: 230 LBS | TEMPERATURE: 97.3 F | HEART RATE: 66 BPM | RESPIRATION RATE: 16 BRPM | HEIGHT: 72 IN

## 2024-08-14 DIAGNOSIS — L72.3 SEBACEOUS CYST: ICD-10-CM

## 2024-08-14 PROCEDURE — 2500000003 HC RX 250 WO HCPCS: Performed by: SURGERY

## 2024-08-14 PROCEDURE — 2709999900 HC NON-CHARGEABLE SUPPLY: Performed by: SURGERY

## 2024-08-14 PROCEDURE — 3600000002 HC SURGERY LEVEL 2 BASE: Performed by: SURGERY

## 2024-08-14 PROCEDURE — A4217 STERILE WATER/SALINE, 500 ML: HCPCS | Performed by: SURGERY

## 2024-08-14 PROCEDURE — 3600000012 HC SURGERY LEVEL 2 ADDTL 15MIN: Performed by: SURGERY

## 2024-08-14 PROCEDURE — 2580000003 HC RX 258: Performed by: SURGERY

## 2024-08-14 PROCEDURE — 6360000002 HC RX W HCPCS: Performed by: SURGERY

## 2024-08-14 PROCEDURE — 7100000010 HC PHASE II RECOVERY - FIRST 15 MIN: Performed by: SURGERY

## 2024-08-14 PROCEDURE — 88304 TISSUE EXAM BY PATHOLOGIST: CPT

## 2024-08-14 RX ORDER — MAGNESIUM HYDROXIDE 1200 MG/15ML
LIQUID ORAL CONTINUOUS PRN
Status: COMPLETED | OUTPATIENT
Start: 2024-08-14 | End: 2024-08-14

## 2024-08-14 ASSESSMENT — PAIN - FUNCTIONAL ASSESSMENT
PAIN_FUNCTIONAL_ASSESSMENT: 0-10
PAIN_FUNCTIONAL_ASSESSMENT: 0-10

## 2024-08-14 NOTE — PLAN OF CARE
baseline levels established preoperatively.  19.  The patient/caregiver demonstrates knowledge of the expected responses to the operative or invasive procedure.  20.  Patient/Caregiver has reduced anxiety.  Interventions- Familiarize with environment and equipment.  21. Other:  22. Other:

## 2024-08-14 NOTE — H&P
[facility] GENERAL SURGERY      The H&P was reviewed, the patient was examined, and no change has occurred in the patient's condition since the H&P was completed. The indications for the procedure were reviewed, and any questions were answered.     I updated the progress note from 8/12/2024 which is the H&P.     Vitals:    08/14/24 0934   BP: 124/77   Pulse: 64   Resp: 16   Temp: 97.1 °F (36.2 °C)   SpO2: 97%

## 2024-08-14 NOTE — DISCHARGE INSTRUCTIONS
[facility]    Neftali Garcia M.D.   [facility] Office      [facility] Office        [facility]               [address]                [address]  Cem Valdivia M.D.              [address]           [address]          [place] [address]         [place] [address]  Swapnil Calloway M.D                         [phone]        [facility]                   Jovanny Avendano M.D.          [facility]       POST-OPERATIVE INSTRUCTIONS FOLLOWING EXCISION OF A CYST    The office will call with biopsy results.  Call the office if you have not heard any results in 1 week.     You may remove your dressing tomorrow.     You may shower after you have removed the dressing.  Wash incision gently and pat dry. Do not rub your incision.    Sutures should be removed in 10-14 days.  If you would like for our office to do this then call for appointment time.      Watch for signs of infection:       Fever over 100.5°     Excessive warmth, or bright redness around your incision    Leakage of bloody or cloudy fluid from you incisions

## 2024-08-14 NOTE — BRIEF OP NOTE
Brief Postoperative Note      Patient: Carlos Garcia  YOB: 1975  MRN: [id number]    Date of Procedure: 8/14/2024    Pre-Op Diagnosis Codes:      * Sebaceous cyst [L72.3] - neck    Post-Op Diagnosis: Same       Procedure(s):  EXCISION CYST RIGHT NECK    Surgeon(s):  Marisela Angel MD    Assistant:  Surgical Assistant: Marisela Kramer    Anesthesia: Local    Estimated Blood Loss (mL): Minimal    Complications: None    Specimens:   ID Type Source Tests Collected by Time Destination   A : Cyst Right Neck Tissue Tissue SURGICAL PATHOLOGY Marisela Angel MD 8/14/2024 0958        Implants:  * No implants in log *      Drains: * No LDAs found *    Findings:  Infection Present At Time Of Surgery (PATOS) (choose all levels that have infection present):  - Superficial Infection (skin/subcutaneous) present as evidenced by abscess and pus  Other Findings: As above  This procedure was not performed to treat primary cutaneous melanoma through wide local excision    Electronically signed by MARISELA ANGEL MD on 8/14/2024 at 10:11 AM

## 2024-08-14 NOTE — PROGRESS NOTES
PRE OP INSTRUCTION SHEET                                        3. Aspirin, Ibuprofen, Advil, Naproxen, Vitamin E, fish oil and other Anti-inflammatory products should be stopped for 5 days before surgery or as directed by your physician.   4. Check with your Doctor regarding stopping Plavix, Coumadin, Lovenox, Fragmin or other blood thinners   5. Do not smoke, and do not drink any alcoholic beverages 24 hours prior to surgery.  This includes NA Beer.   6. You may brush your teeth and gargle the morning of surgery.  DO NOT SWALLOW WATER   7. You MUST make arrangements for a responsible adult to take you home after your surgery. You will not be allowed to leave alone or drive yourself home.  It is strongly suggested someone stay with you the first 24 hrs. Your surgery will be cancelled if you do not have a ride home.   8. A parent/legal guardian must accompany a child scheduled for surgery and plan to stay at the hospital until the child is discharged.  Please do not bring other children with you.   9. Please wear simple, loose fitting clothing to the hospital.  Do not bring valuables (money, credit cards, checkbooks, etc.) Do not wear any makeup (including no eye makeup) or nail polish on your fingers or toes.   10. DO NOT wear any jewelry or piercings on day of surgery.  All body piercing jewelry must be removed.   11. If you have dentures,glasses, or contacts they will be removed before going to the OR; we will provide you a container.    12. Please see your family doctor/and cardiologist for a history & physical and/or concerning medications.  Bring any test results/reports from your physician's office. Have history and labs faxed to [phone]    13. Remember to bring Blood Bank bracelet on the day of surgery.   14. If you have a Living Will and Durable Power of  for Healthcare, please bring in a copy.   15. Notify your Surgeon if you develop any illness between now and surgery  
Left message for pt to be here at 0900 for surgery on 8/14/24.  
Went over discharge instructions with patient he verbalized understand discharge instructions. Patient left surgery floor in stable condition to home with all belongings.   
and Phase II process.  23.  Patient pain level is established preoperatively using age appropriate pain scale.  24.  The patient will move to fall risk upon sedation- during and through the recovery phase.  Interventions- orient the patient to the environment, especially the location of the bathroom; provide treaded socks/non-skid footwear; demonstrate and teach back use of the nurse's call system; instruct the patient to call for help before getting out of bed; lock all movable equipment before transferring patient; keep bed in lowest position possible. 25.  Other:

## 2024-08-15 ENCOUNTER — TELEPHONE (OUTPATIENT)
Dept: SURGERY | Age: 49
End: 2024-08-15

## 2024-08-15 NOTE — TELEPHONE ENCOUNTER
----- Message from Dr. Jovanny Avendano MD sent at 8/15/2024  2:59 PM EDT -----  Tell the patient that the lump removed was a benign cyst.  Follow up as needed if he needs us to remove sutures 10-14 days post op.

## 2024-08-17 NOTE — OP NOTE
[address] [address]                            OPERATIVE REPORT      PATIENT NAME: ARLINE GANNON              : 1975  MED REC NO: [id number]                      ROOM: [facility]  ACCOUNT NO: [id number]                       ADMIT DATE: 2024  PROVIDER: Marisela Angel MD      DATE OF PROCEDURE:  2024    SURGEON:  Marisela Angel MD    OPERATION PERFORMED:  Excision of cystic mass, right neck.    PREOPERATIVE DIAGNOSIS:  Cystic mass, right neck.    POSTOPERATIVE DIAGNOSIS:  Cystic mass, right neck.    ANESTHESIA:  Local.    COMPLICATIONS:  None.    ESTIMATED BLOOD LOSS:  Less than 50 mL.    INDICATION FOR OPERATION:  A 48-year-old male with a large cyst on the right neck.  It was causing acute pain and skin sensation disturbance.  I recommended operative excision.  The risks and benefits were explained.  The patient understood them, accepted them, and elected to proceed.    DESCRIPTION OF OPERATION:  The patient was brought to the operating room.  He was prepped and draped in usual surgical sterile fashion.  Local anesthetic was infiltrated.  Elliptical incision was made around the cystic mass on the right neck.  The excised diameter was 2.5 cm.  The incision length was 4.5 cm.  Full-thickness excision was performed.  There was evidence of infection in the skin and superficial soft tissues as there was some purulent fluid in the remnant cyst and surrounding area.  The area was removed in its entirety.  The wound was irrigated.  Local anesthetic was again infiltrated.  An intermediate-type closure was performed by closing both the superficial fascia and subcutaneous tissue with 3-0 Vicryl suture.  3-0 nylon was used to reapproximate the skin.  Bandage was placed.    DISPOSITION:  The patient tolerated the procedure without any acute complication.          MARISELA ANGEL MD      D:  2024

## 2025-02-11 ENCOUNTER — OFFICE VISIT (OUTPATIENT)
Dept: FAMILY MEDICINE CLINIC | Age: 50
End: 2025-02-11
Payer: COMMERCIAL

## 2025-02-11 VITALS
DIASTOLIC BLOOD PRESSURE: 76 MMHG | OXYGEN SATURATION: 98 % | WEIGHT: 238.8 LBS | BODY MASS INDEX: 32.34 KG/M2 | HEART RATE: 84 BPM | SYSTOLIC BLOOD PRESSURE: 124 MMHG | HEIGHT: 72 IN | TEMPERATURE: 98 F

## 2025-02-11 DIAGNOSIS — L72.3 SEBACEOUS CYST: Primary | ICD-10-CM

## 2025-02-11 DIAGNOSIS — R50.9 FEVER, UNSPECIFIED FEVER CAUSE: ICD-10-CM

## 2025-02-11 LAB — S PYO AG THROAT QL: NORMAL

## 2025-02-11 PROCEDURE — 3017F COLORECTAL CA SCREEN DOC REV: CPT | Performed by: NURSE PRACTITIONER

## 2025-02-11 PROCEDURE — G8417 CALC BMI ABV UP PARAM F/U: HCPCS | Performed by: NURSE PRACTITIONER

## 2025-02-11 PROCEDURE — G8427 DOCREV CUR MEDS BY ELIG CLIN: HCPCS | Performed by: NURSE PRACTITIONER

## 2025-02-11 PROCEDURE — 4004F PT TOBACCO SCREEN RCVD TLK: CPT | Performed by: NURSE PRACTITIONER

## 2025-02-11 PROCEDURE — 87880 STREP A ASSAY W/OPTIC: CPT | Performed by: NURSE PRACTITIONER

## 2025-02-11 PROCEDURE — 99214 OFFICE O/P EST MOD 30 MIN: CPT | Performed by: NURSE PRACTITIONER

## 2025-02-11 RX ORDER — SULFAMETHOXAZOLE AND TRIMETHOPRIM 800; 160 MG/1; MG/1
1 TABLET ORAL 2 TIMES DAILY
Qty: 20 TABLET | Refills: 0 | Status: SHIPPED | OUTPATIENT
Start: 2025-02-11 | End: 2025-02-21

## 2025-02-11 RX ORDER — OSELTAMIVIR PHOSPHATE 75 MG/1
75 CAPSULE ORAL 2 TIMES DAILY
Qty: 10 CAPSULE | Refills: 0 | Status: SHIPPED | OUTPATIENT
Start: 2025-02-11 | End: 2025-02-12 | Stop reason: SDUPTHER

## 2025-02-11 SDOH — ECONOMIC STABILITY: FOOD INSECURITY: WITHIN THE PAST 12 MONTHS, THE FOOD YOU BOUGHT JUST DIDN'T LAST AND YOU DIDN'T HAVE MONEY TO GET MORE.: NEVER TRUE

## 2025-02-11 SDOH — ECONOMIC STABILITY: FOOD INSECURITY: WITHIN THE PAST 12 MONTHS, YOU WORRIED THAT YOUR FOOD WOULD RUN OUT BEFORE YOU GOT MONEY TO BUY MORE.: NEVER TRUE

## 2025-02-11 ASSESSMENT — PATIENT HEALTH QUESTIONNAIRE - PHQ9
2. FEELING DOWN, DEPRESSED OR HOPELESS: NOT AT ALL
SUM OF ALL RESPONSES TO PHQ9 QUESTIONS 1 & 2: 0
1. LITTLE INTEREST OR PLEASURE IN DOING THINGS: NOT AT ALL
SUM OF ALL RESPONSES TO PHQ QUESTIONS 1-9: 0

## 2025-02-11 NOTE — PROGRESS NOTES
Assessment & Plan    Assessment & Plan  1. Infected sebaceous cyst.  The cyst on his back has been intermittently draining and causing discomfort. He has a history of similar cysts and has had multiple cysts removed in the past. He reports an intolerance to fluoroquinolones, specifically Levaquin, which causes severe Achilles tendon pain. He is advised to avoid touching the cyst and to continue using Dial soap to help reduce bacterial load.  On exam small draining cyst on the right upper back with no associated erythema or warmth.  I do not suspect patient's fever is related to the sebaceous cyst but rather viral in nature given his known exposure to influenza A and strep.  A culture of the cyst will be obtained. Bactrim DS, to be taken twice daily for 10 days, has been prescribed and sent to [facility] in [place].  Recommend patient follow-up with surgeon for excision however he declines.  Patient states he may follow-up with dermatology and see if they can do the procedure in office.    2. Potential influenza.  He tested negative for strep despite exposure to his daughter who tested positive. 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 Symptoms include a slight headache and a feeling of fullness in his ears. He is advised to maintain adequate hydration and rest.  Continue acetaminophen or ibuprofen for body aches and fever. I suspect his symptoms are are likely viral in nature versus being related to sebaceous cyst- likely influenza.  Likely too early to test. Tamiflu, to be taken twice daily for 5 days, has been prescribed. If symptoms worsen, he should contact the office.    PROCEDURE  The patient has had multiple sebaceous cysts removed in the past, including one from his neck in the fall.    Sebaceous cyst  -     sulfamethoxazole-trimethoprim (BACTRIM DS;SEPTRA DS) 800-160 MG per tablet; Take 1 tablet by mouth 2 times daily for 10 days,

## 2025-02-12 DIAGNOSIS — R50.9 FEVER, UNSPECIFIED FEVER CAUSE: ICD-10-CM

## 2025-02-12 RX ORDER — OSELTAMIVIR PHOSPHATE 75 MG/1
75 CAPSULE ORAL 2 TIMES DAILY
Qty: 10 CAPSULE | Refills: 0 | Status: SHIPPED | OUTPATIENT
Start: 2025-02-12 | End: 2025-02-17

## 2025-02-12 NOTE — TELEPHONE ENCOUNTER
Pt calling and states they don't have the tamiflu in stock at [facility] until Sunday. Asking if you can send it to [facility] [place].

## 2025-02-14 LAB
BACTERIA SPEC AEROBE CULT: ABNORMAL
GRAM STN SPEC: ABNORMAL
ORGANISM: ABNORMAL

## 2025-05-05 ENCOUNTER — TELEPHONE (OUTPATIENT)
Dept: FAMILY MEDICINE CLINIC | Age: 50
End: 2025-05-05

## 2025-05-05 DIAGNOSIS — L72.3 SEBACEOUS CYST: Primary | ICD-10-CM

## 2025-05-05 RX ORDER — SULFAMETHOXAZOLE AND TRIMETHOPRIM 800; 160 MG/1; MG/1
1 TABLET ORAL 2 TIMES DAILY
Qty: 20 TABLET | Refills: 0 | Status: SHIPPED | OUTPATIENT
Start: 2025-05-05 | End: 2025-05-15

## 2025-05-05 NOTE — TELEPHONE ENCOUNTER
Patient seen in Feb for sebaceous cyst.  He does have an appt for this month with dermatology but it has flared up again.  He wanted to see if you would refill the Batrim DS (he said this worked where other abx have not helped in the past)   [facility]